# Patient Record
Sex: MALE | Race: WHITE | NOT HISPANIC OR LATINO | Employment: OTHER | URBAN - METROPOLITAN AREA
[De-identification: names, ages, dates, MRNs, and addresses within clinical notes are randomized per-mention and may not be internally consistent; named-entity substitution may affect disease eponyms.]

---

## 2017-10-18 ENCOUNTER — HOSPITAL ENCOUNTER (EMERGENCY)
Facility: HOSPITAL | Age: 67
Discharge: HOME | End: 2017-10-18
Admitting: EMERGENCY MEDICINE
Payer: COMMERCIAL

## 2017-10-18 ENCOUNTER — APPOINTMENT (EMERGENCY)
Dept: CT IMAGING | Facility: HOSPITAL | Age: 67
End: 2017-10-18
Payer: COMMERCIAL

## 2017-10-18 ENCOUNTER — APPOINTMENT (EMERGENCY)
Dept: RADIOLOGY | Facility: HOSPITAL | Age: 67
End: 2017-10-18
Payer: COMMERCIAL

## 2017-10-18 VITALS
DIASTOLIC BLOOD PRESSURE: 67 MMHG | WEIGHT: 166.23 LBS | OXYGEN SATURATION: 100 % | TEMPERATURE: 97.9 F | RESPIRATION RATE: 18 BRPM | HEART RATE: 68 BPM | SYSTOLIC BLOOD PRESSURE: 150 MMHG

## 2017-10-18 DIAGNOSIS — R42 ORTHOSTATIC DIZZINESS: Primary | ICD-10-CM

## 2017-10-18 LAB
ALBUMIN SERPL BCP-MCNC: 3.5 G/DL (ref 3.5–5)
ALP SERPL-CCNC: 117 U/L (ref 46–116)
ALT SERPL W P-5'-P-CCNC: 18 U/L (ref 12–78)
ANION GAP SERPL CALCULATED.3IONS-SCNC: 7 MMOL/L (ref 4–13)
APTT PPP: 30 SECONDS (ref 23–35)
AST SERPL W P-5'-P-CCNC: 17 U/L (ref 5–45)
BASOPHILS # BLD AUTO: 0.03 THOUSANDS/ΜL (ref 0–0.1)
BASOPHILS NFR BLD AUTO: 0 % (ref 0–1)
BILIRUB DIRECT SERPL-MCNC: 0.11 MG/DL (ref 0–0.2)
BILIRUB SERPL-MCNC: 0.6 MG/DL (ref 0.2–1)
BUN SERPL-MCNC: 18 MG/DL (ref 5–25)
CALCIUM SERPL-MCNC: 9.1 MG/DL (ref 8.3–10.1)
CHLORIDE SERPL-SCNC: 106 MMOL/L (ref 100–108)
CLARITY, POC: CLEAR
CO2 SERPL-SCNC: 30 MMOL/L (ref 21–32)
COLOR, POC: YELLOW
CREAT SERPL-MCNC: 1.06 MG/DL (ref 0.6–1.3)
EOSINOPHIL # BLD AUTO: 0.15 THOUSAND/ΜL (ref 0–0.61)
EOSINOPHIL NFR BLD AUTO: 2 % (ref 0–6)
ERYTHROCYTE [DISTWIDTH] IN BLOOD BY AUTOMATED COUNT: 14.6 % (ref 11.6–15.1)
EXT BILIRUBIN, UA: ABNORMAL
EXT BLOOD URINE: ABNORMAL
EXT GLUCOSE, UA: ABNORMAL
EXT KETONES: ABNORMAL
EXT NITRITE, UA: ABNORMAL
EXT PH, UA: 6
EXT PROTEIN, UA: ABNORMAL
EXT SPECIFIC GRAVITY, UA: 1.02
EXT UROBILINOGEN: 0.2
GFR SERPL CREATININE-BSD FRML MDRD: 72 ML/MIN/1.73SQ M
GLUCOSE SERPL-MCNC: 127 MG/DL (ref 65–140)
HCT VFR BLD AUTO: 36.8 % (ref 36.5–49.3)
HGB BLD-MCNC: 11.3 G/DL (ref 12–17)
INR PPP: 1 (ref 0.86–1.16)
LACTATE SERPL-SCNC: 1 MMOL/L (ref 0.5–2)
LYMPHOCYTES # BLD AUTO: 2.26 THOUSANDS/ΜL (ref 0.6–4.47)
LYMPHOCYTES NFR BLD AUTO: 33 % (ref 14–44)
MCH RBC QN AUTO: 24.9 PG (ref 26.8–34.3)
MCHC RBC AUTO-ENTMCNC: 30.7 G/DL (ref 31.4–37.4)
MCV RBC AUTO: 81 FL (ref 82–98)
MONOCYTES # BLD AUTO: 0.6 THOUSAND/ΜL (ref 0.17–1.22)
MONOCYTES NFR BLD AUTO: 9 % (ref 4–12)
NEUTROPHILS # BLD AUTO: 3.86 THOUSANDS/ΜL (ref 1.85–7.62)
NEUTS SEG NFR BLD AUTO: 56 % (ref 43–75)
NRBC BLD AUTO-RTO: 0 /100 WBCS
PLATELET # BLD AUTO: 166 THOUSANDS/UL (ref 149–390)
PMV BLD AUTO: 10.5 FL (ref 8.9–12.7)
POTASSIUM SERPL-SCNC: 4.4 MMOL/L (ref 3.5–5.3)
PROT SERPL-MCNC: 7.7 G/DL (ref 6.4–8.2)
PROTHROMBIN TIME: 13.4 SECONDS (ref 12.1–14.4)
RBC # BLD AUTO: 4.53 MILLION/UL (ref 3.88–5.62)
SODIUM SERPL-SCNC: 143 MMOL/L (ref 136–145)
TROPONIN I SERPL-MCNC: 0.02 NG/ML
TROPONIN I SERPL-MCNC: 0.02 NG/ML
TSH SERPL DL<=0.05 MIU/L-ACNC: 0.74 UIU/ML (ref 0.36–3.74)
WBC # BLD AUTO: 6.92 THOUSAND/UL (ref 4.31–10.16)
WBC # BLD EST: ABNORMAL 10*3/UL

## 2017-10-18 PROCEDURE — 99285 EMERGENCY DEPT VISIT HI MDM: CPT

## 2017-10-18 PROCEDURE — 96374 THER/PROPH/DIAG INJ IV PUSH: CPT

## 2017-10-18 PROCEDURE — 70450 CT HEAD/BRAIN W/O DYE: CPT

## 2017-10-18 PROCEDURE — 84443 ASSAY THYROID STIM HORMONE: CPT | Performed by: PHYSICIAN ASSISTANT

## 2017-10-18 PROCEDURE — 71020 HB CHEST X-RAY 2VW FRONTAL&LATL: CPT

## 2017-10-18 PROCEDURE — 80048 BASIC METABOLIC PNL TOTAL CA: CPT | Performed by: PHYSICIAN ASSISTANT

## 2017-10-18 PROCEDURE — 93005 ELECTROCARDIOGRAM TRACING: CPT | Performed by: PHYSICIAN ASSISTANT

## 2017-10-18 PROCEDURE — 85610 PROTHROMBIN TIME: CPT | Performed by: PHYSICIAN ASSISTANT

## 2017-10-18 PROCEDURE — 84484 ASSAY OF TROPONIN QUANT: CPT | Performed by: PHYSICIAN ASSISTANT

## 2017-10-18 PROCEDURE — 81002 URINALYSIS NONAUTO W/O SCOPE: CPT | Performed by: PHYSICIAN ASSISTANT

## 2017-10-18 PROCEDURE — 96361 HYDRATE IV INFUSION ADD-ON: CPT

## 2017-10-18 PROCEDURE — 80076 HEPATIC FUNCTION PANEL: CPT | Performed by: PHYSICIAN ASSISTANT

## 2017-10-18 PROCEDURE — 85025 COMPLETE CBC W/AUTO DIFF WBC: CPT | Performed by: PHYSICIAN ASSISTANT

## 2017-10-18 PROCEDURE — 73080 X-RAY EXAM OF ELBOW: CPT

## 2017-10-18 PROCEDURE — 85730 THROMBOPLASTIN TIME PARTIAL: CPT | Performed by: PHYSICIAN ASSISTANT

## 2017-10-18 PROCEDURE — 36415 COLL VENOUS BLD VENIPUNCTURE: CPT | Performed by: PHYSICIAN ASSISTANT

## 2017-10-18 PROCEDURE — 83605 ASSAY OF LACTIC ACID: CPT | Performed by: PHYSICIAN ASSISTANT

## 2017-10-18 RX ORDER — GLIPIZIDE 5 MG/1
5 TABLET ORAL
COMMUNITY
End: 2020-09-01 | Stop reason: HOSPADM

## 2017-10-18 RX ORDER — ISOSORBIDE DINITRATE 30 MG/1
30 TABLET ORAL DAILY
COMMUNITY

## 2017-10-18 RX ORDER — LANOLIN ALCOHOL/MO/W.PET/CERES
CREAM (GRAM) TOPICAL DAILY
COMMUNITY

## 2017-10-18 RX ORDER — ENALAPRIL MALEATE 10 MG/1
10 TABLET ORAL DAILY
COMMUNITY

## 2017-10-18 RX ORDER — ASPIRIN 81 MG/1
81 TABLET, CHEWABLE ORAL DAILY
COMMUNITY

## 2017-10-18 RX ORDER — ATORVASTATIN CALCIUM 20 MG/1
20 TABLET, FILM COATED ORAL DAILY
COMMUNITY

## 2017-10-18 RX ORDER — FAMOTIDINE 20 MG/1
20 TABLET, FILM COATED ORAL DAILY
COMMUNITY

## 2017-10-18 RX ORDER — MECLIZINE HYDROCHLORIDE 25 MG/1
25 TABLET ORAL 3 TIMES DAILY PRN
Qty: 15 TABLET | Refills: 0 | Status: SHIPPED | OUTPATIENT
Start: 2017-10-18 | End: 2018-02-03 | Stop reason: ALTCHOICE

## 2017-10-18 RX ORDER — FOLIC ACID 1 MG/1
1 TABLET ORAL DAILY
COMMUNITY

## 2017-10-18 RX ORDER — METOPROLOL SUCCINATE 25 MG/1
25 TABLET, EXTENDED RELEASE ORAL DAILY
COMMUNITY

## 2017-10-18 RX ORDER — PRASUGREL 10 MG/1
10 TABLET, FILM COATED ORAL DAILY
COMMUNITY

## 2017-10-18 RX ORDER — NITROGLYCERIN 40 MG/1
1 PATCH TRANSDERMAL AS NEEDED
COMMUNITY
End: 2020-08-31

## 2017-10-18 RX ORDER — ONDANSETRON 2 MG/ML
4 INJECTION INTRAMUSCULAR; INTRAVENOUS ONCE
Status: COMPLETED | OUTPATIENT
Start: 2017-10-18 | End: 2017-10-18

## 2017-10-18 RX ORDER — GABAPENTIN 300 MG/1
100 CAPSULE ORAL 2 TIMES DAILY
COMMUNITY

## 2017-10-18 RX ORDER — MECLIZINE HYDROCHLORIDE 25 MG/1
50 TABLET ORAL ONCE
Status: COMPLETED | OUTPATIENT
Start: 2017-10-18 | End: 2017-10-18

## 2017-10-18 RX ADMIN — SODIUM CHLORIDE 1000 ML: 0.9 INJECTION, SOLUTION INTRAVENOUS at 16:48

## 2017-10-18 RX ADMIN — MECLIZINE HYDROCHLORIDE 50 MG: 25 TABLET ORAL at 16:49

## 2017-10-18 RX ADMIN — ONDANSETRON 4 MG: 2 INJECTION INTRAMUSCULAR; INTRAVENOUS at 16:48

## 2017-10-18 NOTE — ED PROVIDER NOTES
History  Chief Complaint   Patient presents with    Dizziness     pt states he felt dizzy and fell in his bathroom  Pt hit his head  Denies LOC  Pt still dizzy on arrival      Fall     69-year-old male presents for evaluation of dizziness  Occurred just prior to arrival   States he was getting out of his bed to go the bathroom when he started to feel dizzy  States it was a combination of feeling like he was going to pass out on room spinning  He fell forward and braced his fall into a wall with his hands and ended up striking his head  He never lost consciousness  He never fell to the ground  He remembers everything before and afterwards  Upon getting up he continued to feel slightly dizzy  His wife notes that she has previously told him he needs to take his time when he gets out of bed to go to the bathroom to prevent him from becoming dizzy  He has never had dizziness this severe  Denies any headache with this  No visual disturbances  No chest pain or shortness of breath with this  He continues to feel dizzy although slightly improving  No recent illnesses  No neck pain or stiffness  No fevers chills nausea vomiting  Also states he struck his elbow when he braced his fall, left elbow  Small abrasion  Minimal pain  Past medical history of hypertension hyperlipidemia and diabetes          History provided by:  Patient   used: No    Dizziness   Quality:  Lightheadedness and room spinning  Severity:  Moderate  Onset quality:  Gradual  Duration:  2 hours  Timing:  Constant  Progression:  Unchanged  Chronicity:  New  Context: standing up    Relieved by:  Nothing  Worsened by:  Nothing  Ineffective treatments:  None tried  Associated symptoms: no blood in stool, no chest pain, no diarrhea, no headaches, no hearing loss, no nausea, no palpitations, no shortness of breath, no syncope, no tinnitus, no vision changes, no vomiting and no weakness    Risk factors: heart disease and multiple medications    Risk factors: no anemia, no hx of stroke, no hx of vertigo, no Meniere's disease and no new medications        Prior to Admission Medications   Prescriptions Last Dose Informant Patient Reported? Taking?   aspirin 81 mg chewable tablet   Yes Yes   Sig: Chew 81 mg daily   atorvastatin (LIPITOR) 20 mg tablet   Yes Yes   Sig: Take 20 mg by mouth daily   cyanocobalamin (VITAMIN B-12) 1,000 mcg tablet   Yes Yes   Sig: Take by mouth daily   enalapril (VASOTEC) 10 mg tablet   Yes Yes   Sig: Take 10 mg by mouth daily   famotidine (PEPCID) 20 mg tablet   Yes Yes   Sig: Take 20 mg by mouth daily   folic acid (FOLVITE) 1 mg tablet   Yes Yes   Sig: Take by mouth daily   gabapentin (NEURONTIN) 300 mg capsule   Yes Yes   Sig: Take 100 mg by mouth 3 (three) times a day   glipiZIDE (GLUCOTROL) 5 mg tablet   Yes Yes   Sig: Take 5 mg by mouth 2 (two) times a day before meals   isosorbide dinitrate (ISORDIL) 30 mg tablet   Yes Yes   Sig: Take 30 mg by mouth daily   metFORMIN (GLUCOPHAGE) 1000 MG tablet   Yes Yes   Sig: Take 1,000 mg by mouth 2 (two) times a day with meals   metoprolol succinate (TOPROL-XL) 25 mg 24 hr tablet   Yes Yes   Sig: Take 25 mg by mouth 2 (two) times a day   nitroglycerin (NITRODUR) 0 2 mg/hr   Yes Yes   Sig: Place 1 patch on the skin daily   prasugrel (EFFIENT) tablet   Yes Yes   Sig: Take 10 mg by mouth   sitaGLIPtin (JANUVIA) 100 mg tablet   Yes Yes   Sig: Take 100 mg by mouth daily      Facility-Administered Medications: None       Past Medical History:   Diagnosis Date    Diabetes mellitus ([facility] [address] )     Hyperlipidemia     Hypertension        No past surgical history on file  No family history on file  I have reviewed and agree with the history as documented      Social History   Substance Use Topics    Smoking status: Current Every Day Smoker     Packs/day: 0 25     Types: Cigarettes    Smokeless tobacco: Not on file    Alcohol use No        Review of Systems   Constitutional: Negative for activity change, appetite change, chills, diaphoresis, fatigue, fever and unexpected weight change  HENT: Negative for congestion, hearing loss, rhinorrhea, sinus pressure, sore throat, tinnitus and trouble swallowing  Eyes: Negative for photophobia and visual disturbance  Respiratory: Negative for apnea, cough, choking, chest tightness, shortness of breath, wheezing and stridor  Cardiovascular: Negative for chest pain, palpitations, leg swelling and syncope  Gastrointestinal: Negative for abdominal distention, abdominal pain, blood in stool, constipation, diarrhea, nausea and vomiting  Genitourinary: Negative for decreased urine volume, difficulty urinating, dysuria, enuresis, flank pain, frequency, hematuria and urgency  Musculoskeletal: Negative for arthralgias, myalgias, neck pain and neck stiffness  Skin: Negative for color change, pallor, rash and wound  Allergic/Immunologic: Negative  Neurological: Positive for dizziness and light-headedness  Negative for tremors, syncope, weakness, numbness and headaches  Hematological: Negative  Psychiatric/Behavioral: Negative  All other systems reviewed and are negative  Physical Exam  ED Triage Vitals   Temperature Pulse Respirations Blood Pressure SpO2   10/18/17 1800 10/18/17 1625 10/18/17 1625 10/18/17 1625 10/18/17 1625   97 9 °F (36 6 °C) 65 18 (!) 175/75 100 %      Temp Source Heart Rate Source Patient Position - Orthostatic VS BP Location FiO2 (%)   10/18/17 1800 10/18/17 1625 10/18/17 1625 10/18/17 1625 --   Oral Monitor Lying Right arm       Pain Score       10/18/17 1958       No Pain           Physical Exam   Constitutional: He is oriented to person, place, and time  He appears well-developed and well-nourished  Non-toxic appearance  He does not have a sickly appearance  He does not appear ill  No distress  HENT:   Head: Normocephalic and atraumatic     Eyes: EOM and lids are normal  Pupils are equal, round, and reactive to light  Neck: Normal range of motion  Neck supple  Cardiovascular: Normal rate, regular rhythm, S1 normal, S2 normal, normal heart sounds, intact distal pulses and normal pulses  Exam reveals no gallop, no distant heart sounds, no friction rub and no decreased pulses  No murmur heard  Pulses:       Radial pulses are 2+ on the right side, and 2+ on the left side  Pulmonary/Chest: Effort normal and breath sounds normal  No accessory muscle usage  No apnea, no tachypnea and no bradypnea  No respiratory distress  He has no decreased breath sounds  He has no wheezes  He has no rhonchi  He has no rales  Abdominal: Soft  Normal appearance and bowel sounds are normal  He exhibits no distension and no mass  There is no tenderness  There is no rigidity, no rebound and no guarding  No hernia  Musculoskeletal: Normal range of motion  He exhibits no edema or deformity  Left forearm: He exhibits tenderness (Minimal tenderness surrounding the abrasion of the left forearm/elbow no laceration  No bleeding  No significant swelling )  Neurological: He is alert and oriented to person, place, and time  No cranial nerve deficit  GCS eye subscore is 4  GCS verbal subscore is 5  GCS motor subscore is 6  GCS 15  AAOx3  No focal neuro deficits  CN II-XII grossly intact  Speech normal, no aphasia or dysarthria  No pronator drift  Cerebellar function normal  Finger to nose normal  PERRL  EOMI  Peripheral vision intact  No nystagmus  Upper and lower extremity strength 5/5 through   strength 5/5 b/l  Gross sensation intact b/l  Pt ambulating in department  Skin: Skin is warm, dry and intact  No rash noted  He is not diaphoretic  No erythema  No pallor  Psychiatric: His speech is normal    Nursing note and vitals reviewed        ED Medications  Medications   ondansetron (ZOFRAN) injection 4 mg (4 mg Intravenous Given 10/18/17 9187)   sodium chloride 0 9 % bolus 1,000 mL (0 mL Intravenous Stopped 10/18/17 1807)   meclizine (ANTIVERT) tablet 50 mg (50 mg Oral Given 10/18/17 1649)       Diagnostic Studies  Labs Reviewed   CBC AND DIFFERENTIAL - Abnormal        Result Value Ref Range Status    Hemoglobin 11 3 (*) 12 0 - 17 0 g/dL Final    MCV 81 (*) 82 - 98 fL Final    MCH 24 9 (*) 26 8 - 34 3 pg Final    MCHC 30 7 (*) 31 4 - 37 4 g/dL Final    WBC 6 92  4 31 - 10 16 Thousand/uL Final    RBC 4 53  3 88 - 5 62 Million/uL Final    Hematocrit 36 8  36 5 - 49 3 % Final    RDW 14 6  11 6 - 15 1 % Final    MPV 10 5  8 9 - 12 7 fL Final    Platelets 952  866 - 390 Thousands/uL Final    nRBC 0  /100 WBCs Final    Neutrophils Relative 56  43 - 75 % Final    Lymphocytes Relative 33  14 - 44 % Final    Monocytes Relative 9  4 - 12 % Final    Eosinophils Relative 2  0 - 6 % Final    Basophils Relative 0  0 - 1 % Final    Neutrophils Absolute 3 86  1 85 - 7 62 Thousands/µL Final    Lymphocytes Absolute 2 26  0 60 - 4 47 Thousands/µL Final    Monocytes Absolute 0 60  0 17 - 1 22 Thousand/µL Final    Eosinophils Absolute 0 15  0 00 - 0 61 Thousand/µL Final    Basophils Absolute 0 03  0 00 - 0 10 Thousands/µL Final   HEPATIC FUNCTION PANEL - Abnormal     Alkaline Phosphatase 117 (*) 46 - 116 U/L Final    Total Bilirubin 0 60  0 20 - 1 00 mg/dL Final    Bilirubin, Direct 0 11  0 00 - 0 20 mg/dL Final    AST 17  5 - 45 U/L Final    Comment:   Specimen collection should occur prior to Sulfasalazine administration due to the potential for falsely depressed results  ALT 18  12 - 78 U/L Final    Comment:   Specimen collection should occur prior to Sulfasalazine administration due to the potential for falsely depressed results       Total Protein 7 7  6 4 - 8 2 g/dL Final    Albumin 3 5  3 5 - 5 0 g/dL Final   POCT URINALYSIS DIPSTICK - Abnormal     Color, UA yellow   Final    Clarity, UA clear   Final    EXT Glucose, UA neg   Final    EXT Bilirubin, UA (Ref: Negative) neg   Final    EXT Ketones, UA (Ref: Negative) neg Final    EXT Spec Grav, UA 1 020   Final    EXT Blood, UA (Ref: Negative) trace   Final    EXT pH, UA 6 0   Final    EXT Protein, UA (Ref: Negative) 100++   Final    EXT Urobilinogen, UA (Ref: 0 2- 1 0) 0 2   Final    EXT Leukocytes, UA (Ref: Negative) neg   Final    EXT Nitrite, UA (Ref: Negative) neg   Final   PROTIME-INR - Normal    Protime 13 4  12 1 - 14 4 seconds Final    INR 1 00  0 86 - 1 16 Final   APTT - Normal    PTT 30  23 - 35 seconds Final    Narrative: Therapeutic Heparin Range = 60-90 seconds   TROPONIN I - Normal    Troponin I 0 02  <=0 04 ng/mL Final    Comment: 3Autovalidation override    Narrative:     [employer] Chemistry analyzer 99% cutoff is > 0 04 ng/mL in network labs    o cTnI 99% cutoff is useful only when applied to patients in the clinical setting of myocardial ischemia  o cTnI 99% cutoff should be interpreted in the context of clinical history, ECG findings and possibly cardiac imaging to establish correct diagnosis  o cTnI 99% cutoff may be suggestive but clearly not indicative of a coronary event without the clinical setting of myocardial ischemia  LACTIC ACID, PLASMA - Normal    LACTIC ACID 1 0  0 5 - 2 0 mmol/L Final    Narrative:     Result may be elevated if tourniquet was used during collection  TSH, 3RD GENERATION - Normal    TSH 3RD GENERATON 0 744  0 358 - 3 740 uIU/mL Final    Narrative:     Patients undergoing fluorescein dye angiography may retain small amounts of fluorescein in the body for 48-72 hours post procedure  Samples containing fluorescein can produce falsely depressed TSH values  If the patient had this procedure,a specimen should be resubmitted post fluorescein clearance     TROPONIN I - Normal    Troponin I 0 02  <=0 04 ng/mL Final    Comment: 3Autovalidation override    Narrative:     [employer] Chemistry analyzer 99% cutoff is > 0 04 ng/mL in network labs    o cTnI 99% cutoff is useful only when applied to patients in the clinical setting of myocardial ischemia  o cTnI 99% cutoff should be interpreted in the context of clinical history, ECG findings and possibly cardiac imaging to establish correct diagnosis  o cTnI 99% cutoff may be suggestive but clearly not indicative of a coronary event without the clinical setting of myocardial ischemia  BASIC METABOLIC PANEL    Sodium 966  136 - 145 mmol/L Final    Potassium 4 4  3 5 - 5 3 mmol/L Final    Chloride 106  100 - 108 mmol/L Final    CO2 30  21 - 32 mmol/L Final    Anion Gap 7  4 - 13 mmol/L Final    BUN 18  5 - 25 mg/dL Final    Creatinine 1 06  0 60 - 1 30 mg/dL Final    Comment: Standardized to IDMS reference method    Glucose 127  65 - 140 mg/dL Final    Comment:   If the patient is fasting, the ADA then defines impaired fasting glucose as > 100 mg/dL and diabetes as > or equal to 123 mg/dL  Specimen collection should occur prior to Sulfasalazine administration due to the potential for falsely depressed results  Specimen collection should occur prior to Sulfapyridine administration due to the potential for falsely elevated results  Calcium 9 1  8 3 - 10 1 mg/dL Final    eGFR 72  ml/min/1 73sq m Final    Narrative:     National Kidney Disease Education Program recommendations are as follows:  GFR calculation is accurate only with a steady state creatinine  Chronic Kidney disease less than 60 ml/min/1 73 sq  meters  Kidney failure less than 15 ml/min/1 73 sq  meters  XR chest 2 views   Final Result   No active pulmonary disease  Workstation performed: [id number]         XR elbow 3+ views LEFT   Final Result   No acute osseous abnormality  Workstation performed: [id number]         CT head without contrast   Final Result   No acute intracranial abnormality  Workstation performed: [id number]             Procedures  Procedures      Phone Contacts  ED Phone Contact    ED Course  ED Course as of Oct 26 0052   Wed Oct 18, 2017   1813 Feels better  Resting comfortably    Asking to eat     [address] EKG unchanged  Awaiting troponin          HEART Risk Score    Flowsheet Row Most Recent Value   History  0 Filed at: 10/18/2017 1635   ECG  0 Filed at: 10/18/2017 1635   Age  2 Filed at: 10/18/2017 1635   Risk Factors  2 Filed at: 10/18/2017 1635   Troponin  0 Filed at: 10/18/2017 1635   Heart Score Risk Calculator   History  0 Filed at: 10/18/2017 1635   ECG  0 Filed at: 10/18/2017 1635   Age  2 Filed at: 10/18/2017 1635   Risk Factors  2 Filed at: 10/18/2017 1635   Troponin  0 Filed at: 10/18/2017 1635   HEART Score  4 Filed at: 10/18/2017 1635   HEART Score  4 Filed at: 10/18/2017 1635          NIH Stroke Scale    Flowsheet Row Most Recent Value   Level of Consciousness (1a )  0 Filed at: 10/18/2017 1635   LOC Questions (1b )  0 Filed at: 10/18/2017 1635   LOC Commands (1c )  0 Filed at: 10/18/2017 1635   Best Gaze (2 )  0 Filed at: 10/18/2017 1635   Visual (3 )  0 Filed at: 10/18/2017 1635   Facial Palsy (4 )  0 Filed at: 10/18/2017 1635   Motor Arm, Left (5a )  0 Filed at: 10/18/2017 1635   Motor Arm, Right (5b )  0 Filed at: 10/18/2017 1635   Motor Leg, Left (6a )  0 Filed at: 10/18/2017 1635   Motor Leg, Right (6b )  0 Filed at: 10/18/2017 1635   Limb Ataxia (7 )  0 Filed at: 10/18/2017 1635   Sensory (8 )  0 Filed at: 10/18/2017 1635   Best Language (9 )  0 Filed at: 10/18/2017 1635   Dysarthria (10 )  0 Filed at: 10/18/2017 1635   Extinction and Inattention (11 ) (Formerly Neglect)  0 Filed at: 10/18/2017 1635   Total  0 Filed at: 10/18/2017 1635                        MDM  Number of Diagnoses or Management Options  Orthostatic dizziness: new and requires workup  Diagnosis management comments: DDx including but not limited to: Labyrinthitis, vestibular neuronitis,benign paroxysmal positional vertigo, Meniere's disease, metabolic abnormality, otitis media, tumor, intracranial process, cardiac etiology; doubt vertebral or carotid artery dissection    Differential for his elbow injuring includes but is not limited to sprain, strain, fracture, contusion  Plan:  CT head, cardiac workup, laboratory evaluation, x-ray the elbow  Analgesia  Fluids and meclizine  History suspicious for orthostatic hypotension particularly as patient states his symptoms are reproduced when he gets out of bed and starts ambulating  Amount and/or Complexity of Data Reviewed  Clinical lab tests: ordered and reviewed  Tests in the radiology section of CPT®: ordered and reviewed  Discuss the patient with other providers: yes  Independent visualization of images, tracings, or specimens: yes    Risk of Complications, Morbidity, and/or Mortality  Presenting problems: moderate  Management options: low  General comments: 58-year-old male with dizziness, fall, elbow injury  X-ray of the left elbow obtained reveals no acute osseous abnormalities  Likely a contusion injury  In regards to his dizziness after being given fluids and meclizine his dizziness completely resolved  He is resting comfortably  He has been eating and drinking  He is requesting to go home  He feels much better  He is ambulating in the department without difficulty  His laboratory evaluation is overall unremarkable  Explained to the family that if he has close follow-up with feel comfortable discharging him home as this history and exam is consistent with orthostatic hypertension  Explained that if he suddenly worsens or has persistent dizziness he needs to return at which point he may need to be admitted for TIA workup  The family understands and agrees with this plan  He is already on aspirin  Will make no changes to his chronic medications at this time  Will add meclizine p r n  for dizziness  He has close follow up with family physician  Family and patient states he can easily return should symptoms reoccur or worsen      Patient Progress  Patient progress: stable    CritCare Time    Disposition  Final diagnoses:   Orthostatic dizziness Time reflects when diagnosis was documented in both MDM as applicable and the Disposition within this note     Time User Action Codes Description Comment    10/18/2017  9:15 PM Ernie OHARA Add [R42] Orthostatic dizziness       ED Disposition     ED Disposition Condition Comment    Discharge  Greg Edmonds discharge to home/self care      Condition at discharge: Good        Follow-up Information     Follow up With Specialties Details Why Contact Info    Judy Zabalay  Go to your scheduled appointment this coming Tuesday [phone] [id number]  [phone]          Discharge Medication List as of 10/18/2017  9:17 PM      START taking these medications    Details   meclizine (ANTIVERT) 25 mg tablet Take 1 tablet by mouth 3 (three) times a day as needed for dizziness, Starting Wed 10/18/2017, Print         CONTINUE these medications which have NOT CHANGED    Details   aspirin 81 mg chewable tablet Chew 81 mg daily, Historical Med      atorvastatin (LIPITOR) 20 mg tablet Take 20 mg by mouth daily, Historical Med      cyanocobalamin (VITAMIN B-12) 1,000 mcg tablet Take by mouth daily, Historical Med      enalapril (VASOTEC) 10 mg tablet Take 10 mg by mouth daily, Historical Med      famotidine (PEPCID) 20 mg tablet Take 20 mg by mouth daily, Historical Med      folic acid (FOLVITE) 1 mg tablet Take by mouth daily, Historical Med      gabapentin (NEURONTIN) 300 mg capsule Take 100 mg by mouth 3 (three) times a day, Historical Med      glipiZIDE (GLUCOTROL) 5 mg tablet Take 5 mg by mouth 2 (two) times a day before meals, Historical Med      isosorbide dinitrate (ISORDIL) 30 mg tablet Take 30 mg by mouth daily, Historical Med      metFORMIN (GLUCOPHAGE) 1000 MG tablet Take 1,000 mg by mouth 2 (two) times a day with meals, Historical Med      metoprolol succinate (TOPROL-XL) 25 mg 24 hr tablet Take 25 mg by mouth 2 (two) times a day, Historical Med      nitroglycerin (NITRODUR) 0 2 mg/hr Place 1 patch on the skin daily, Historical Med      prasugrel (EFFIENT) tablet Take 10 mg by mouth, Historical Med      sitaGLIPtin (JANUVIA) 100 mg tablet Take 100 mg by mouth daily, Historical Med           No discharge procedures on file      ED Provider  Electronically Signed by       Vani Rodriguez PA-C  10/26/17 8128

## 2017-10-19 NOTE — DISCHARGE INSTRUCTIONS
Return to the Emergency Department if increased vertigo, pain, fever, vomiting, dizziness, weakness, difficulty breathing or walking  Dizziness   WHAT YOU NEED TO KNOW:   What is dizziness? Dizziness is a feeling of being off balance or unsteady  Common causes of dizziness are an inner ear fluid imbalance or a lack of oxygen in your blood  Dizziness may be acute (lasts 3 days or less) or chronic (lasts longer than 3 days)  You may have dizzy spells that last from seconds to a few hours  What increases my risk for dizziness? Dizziness may get worse during certain activities or when you move a certain way  The following may also increase your risk for dizziness:  · Older age    · An infection, ear surgery, or an inner ear condition, such as Ménière disease    · Stroke, a brain tumor, or a recent head trauma     · An injury that causes a large amount of blood loss    · Heart or blood pressure problems     · Exposure to chemicals, or long-term alcohol use     · Medicines used to treat high blood pressure, seizure disorders, or anxiety and depression     · A nerve disorder, such as multiple sclerosis  What signs and symptoms may happen with dizziness? · A feeling that your surroundings are moving even though you are standing still    · Ringing in your ears or hearing loss     · Feeling faint or lightheaded     · Weakness or unsteadiness     · Double vision or eye movements you cannot control    · Nausea or vomiting     · Confusion  How is the cause of dizziness diagnosed? Your healthcare provider may ask when the dizziness started  Tell the provider if you have dizzy spells, and how long they last  Tell him or her what happens before you become dizzy  The provider will ask if you have other health conditions and if you take any medicines  He or she will check your blood pressure and pulse to see if your dizziness may be related to your heart   Your balance, strength, reflexes, and the way you walk may also be checked  You may need any of the following tests to help find the cause of your dizziness:  · An EKG  records the electrical activity of your heart  An EKG can be used to check for an abnormal heart beat or heart damage  · Blood tests  will check your blood sugar level, infection, and your blood cell levels  · CT or MRI  pictures check for a stroke, head injury, or brain tumor  They also check for brain bleeding or swelling  You may be given contrast liquid to help your brain show up better in the pictures  Tell the healthcare provider if you have ever had an allergic reaction to contrast liquid  Do not enter the MRI room with anything metal  Metal can cause serious injury  Tell the healthcare provider if you have any metal in or on your body  How is dizziness treated? Treatment will depend on the cause of your dizziness  Your healthcare provider may give you oxygen or medicines to decrease your dizziness and nausea  He may also refer you to a specialist  Sheila Kay may need to be admitted to the hospital for treatment  How can I manage my symptoms? · Do not drive  or operate heavy machinery when you are dizzy  · Get up slowly  from sitting or lying down  · Drink plenty of liquids  Liquids help prevent dehydration  Ask how much liquid to drink each day and which liquids are best for you  When should I seek immediate care? · You have a headache and a stiff neck  · You have shaking chills and a fever  · You vomit over and over with no relief  · Your vomit or bowel movements are red or black  · You have pain in your chest, back, or abdomen  · You have numbness, especially in your face, arms, or legs  · You have trouble moving your arms or legs  · You are confused  When should I contact my healthcare provider? · You have a fever  · Your symptoms do not get better with treatment  · You have questions or concerns about your condition or care    CARE AGREEMENT:   You have the right to help plan your care  Learn about your health condition and how it may be treated  Discuss treatment options with your caregivers to decide what care you want to receive  You always have the right to refuse treatment  The above information is an  only  It is not intended as medical advice for individual conditions or treatments  Talk to your doctor, nurse or pharmacist before following any medical regimen to see if it is safe and effective for you  © 2017 [employer]  Information is for End User's use only and may not be sold, redistributed or otherwise used for commercial purposes  All illustrations and images included in CareNotes® are the copyrighted property of [employer]  or [employer]

## 2017-10-19 NOTE — ED NOTES
D/c instructions and rx reviewed w/ pt  All questions and concerns addressed at this time         Bon Roque RN  10/18/17 3694

## 2017-10-20 LAB
ATRIAL RATE: 63 BPM
ATRIAL RATE: 73 BPM
P AXIS: 66 DEGREES
P AXIS: 70 DEGREES
PR INTERVAL: 170 MS
PR INTERVAL: 172 MS
QRS AXIS: 77 DEGREES
QRS AXIS: 78 DEGREES
QRSD INTERVAL: 92 MS
QRSD INTERVAL: 92 MS
QT INTERVAL: 398 MS
QT INTERVAL: 414 MS
QTC INTERVAL: 423 MS
QTC INTERVAL: 438 MS
T WAVE AXIS: 58 DEGREES
T WAVE AXIS: 59 DEGREES
VENTRICULAR RATE: 63 BPM
VENTRICULAR RATE: 73 BPM

## 2018-02-03 ENCOUNTER — APPOINTMENT (EMERGENCY)
Dept: RADIOLOGY | Facility: HOSPITAL | Age: 68
DRG: 194 | End: 2018-02-03
Payer: COMMERCIAL

## 2018-02-03 ENCOUNTER — HOSPITAL ENCOUNTER (INPATIENT)
Facility: HOSPITAL | Age: 68
LOS: 2 days | Discharge: HOME | DRG: 194 | End: 2018-02-05
Attending: EMERGENCY MEDICINE | Admitting: INTERNAL MEDICINE
Payer: COMMERCIAL

## 2018-02-03 DIAGNOSIS — J18.9 CAP (COMMUNITY ACQUIRED PNEUMONIA): ICD-10-CM

## 2018-02-03 DIAGNOSIS — I10 ESSENTIAL HYPERTENSION: Chronic | ICD-10-CM

## 2018-02-03 DIAGNOSIS — J10.1 INFLUENZA A: ICD-10-CM

## 2018-02-03 DIAGNOSIS — E11.9 DIABETES (HCC): Chronic | ICD-10-CM

## 2018-02-03 DIAGNOSIS — R06.02 SHORTNESS OF BREATH: Primary | ICD-10-CM

## 2018-02-03 DIAGNOSIS — R07.9 CHEST PAIN: ICD-10-CM

## 2018-02-03 LAB
ALBUMIN SERPL BCP-MCNC: 3.4 G/DL (ref 3.5–5)
ALP SERPL-CCNC: 118 U/L (ref 46–116)
ALT SERPL W P-5'-P-CCNC: 17 U/L (ref 12–78)
ANION GAP SERPL CALCULATED.3IONS-SCNC: 11 MMOL/L (ref 4–13)
AST SERPL W P-5'-P-CCNC: 18 U/L (ref 5–45)
BASOPHILS # BLD AUTO: 0.03 THOUSANDS/ΜL (ref 0–0.1)
BASOPHILS NFR BLD AUTO: 0 % (ref 0–1)
BILIRUB DIRECT SERPL-MCNC: 0.17 MG/DL (ref 0–0.2)
BILIRUB SERPL-MCNC: 0.4 MG/DL (ref 0.2–1)
BUN SERPL-MCNC: 28 MG/DL (ref 5–25)
CALCIUM SERPL-MCNC: 9.2 MG/DL (ref 8.3–10.1)
CHLORIDE SERPL-SCNC: 103 MMOL/L (ref 100–108)
CLARITY, POC: ABNORMAL
CO2 SERPL-SCNC: 28 MMOL/L (ref 21–32)
COLOR, POC: ABNORMAL
CREAT SERPL-MCNC: 1.32 MG/DL (ref 0.6–1.3)
EOSINOPHIL # BLD AUTO: 0.02 THOUSAND/ΜL (ref 0–0.61)
EOSINOPHIL NFR BLD AUTO: 0 % (ref 0–6)
ERYTHROCYTE [DISTWIDTH] IN BLOOD BY AUTOMATED COUNT: 14.6 % (ref 11.6–15.1)
EXT BILIRUBIN, UA: ABNORMAL
EXT BLOOD URINE: NEGATIVE
EXT GLUCOSE, UA: NEGATIVE
EXT KETONES: NEGATIVE
EXT NITRITE, UA: NEGATIVE
EXT PH, UA: 5
EXT PROTEIN, UA: ABNORMAL
EXT SPECIFIC GRAVITY, UA: 1.03
EXT UROBILINOGEN: 0.2
GFR SERPL CREATININE-BSD FRML MDRD: 55 ML/MIN/1.73SQ M
GLUCOSE SERPL-MCNC: 116 MG/DL (ref 65–140)
HCT VFR BLD AUTO: 34.6 % (ref 36.5–49.3)
HGB BLD-MCNC: 10.5 G/DL (ref 12–17)
LACTATE SERPL-SCNC: 2 MMOL/L (ref 0.5–2)
LYMPHOCYTES # BLD AUTO: 1.52 THOUSANDS/ΜL (ref 0.6–4.47)
LYMPHOCYTES NFR BLD AUTO: 20 % (ref 14–44)
MCH RBC QN AUTO: 24.9 PG (ref 26.8–34.3)
MCHC RBC AUTO-ENTMCNC: 30.3 G/DL (ref 31.4–37.4)
MCV RBC AUTO: 82 FL (ref 82–98)
MONOCYTES # BLD AUTO: 0.64 THOUSAND/ΜL (ref 0.17–1.22)
MONOCYTES NFR BLD AUTO: 8 % (ref 4–12)
NEUTROPHILS # BLD AUTO: 5.39 THOUSANDS/ΜL (ref 1.85–7.62)
NEUTS SEG NFR BLD AUTO: 71 % (ref 43–75)
NRBC BLD AUTO-RTO: 0 /100 WBCS
PLATELET # BLD AUTO: 165 THOUSANDS/UL (ref 149–390)
PMV BLD AUTO: 11.9 FL (ref 8.9–12.7)
POTASSIUM SERPL-SCNC: 4.4 MMOL/L (ref 3.5–5.3)
PROT SERPL-MCNC: 7.3 G/DL (ref 6.4–8.2)
RBC # BLD AUTO: 4.21 MILLION/UL (ref 3.88–5.62)
SODIUM SERPL-SCNC: 142 MMOL/L (ref 136–145)
TROPONIN I SERPL-MCNC: 0.03 NG/ML
WBC # BLD AUTO: 7.62 THOUSAND/UL (ref 4.31–10.16)
WBC # BLD EST: NEGATIVE 10*3/UL

## 2018-02-03 PROCEDURE — 96374 THER/PROPH/DIAG INJ IV PUSH: CPT

## 2018-02-03 PROCEDURE — 80076 HEPATIC FUNCTION PANEL: CPT | Performed by: PHYSICIAN ASSISTANT

## 2018-02-03 PROCEDURE — 84484 ASSAY OF TROPONIN QUANT: CPT | Performed by: PHYSICIAN ASSISTANT

## 2018-02-03 PROCEDURE — 83605 ASSAY OF LACTIC ACID: CPT | Performed by: PHYSICIAN ASSISTANT

## 2018-02-03 PROCEDURE — 71046 X-RAY EXAM CHEST 2 VIEWS: CPT

## 2018-02-03 PROCEDURE — 93005 ELECTROCARDIOGRAM TRACING: CPT

## 2018-02-03 PROCEDURE — 94640 AIRWAY INHALATION TREATMENT: CPT

## 2018-02-03 PROCEDURE — 99285 EMERGENCY DEPT VISIT HI MDM: CPT

## 2018-02-03 PROCEDURE — 99220 PR INITIAL OBSERVATION CARE/DAY 70 MINUTES: CPT | Performed by: PHYSICIAN ASSISTANT

## 2018-02-03 PROCEDURE — 96361 HYDRATE IV INFUSION ADD-ON: CPT

## 2018-02-03 PROCEDURE — 36415 COLL VENOUS BLD VENIPUNCTURE: CPT | Performed by: PHYSICIAN ASSISTANT

## 2018-02-03 PROCEDURE — 80048 BASIC METABOLIC PNL TOTAL CA: CPT | Performed by: PHYSICIAN ASSISTANT

## 2018-02-03 PROCEDURE — 81002 URINALYSIS NONAUTO W/O SCOPE: CPT | Performed by: PHYSICIAN ASSISTANT

## 2018-02-03 PROCEDURE — 85025 COMPLETE CBC W/AUTO DIFF WBC: CPT | Performed by: PHYSICIAN ASSISTANT

## 2018-02-03 RX ORDER — ISOSORBIDE DINITRATE 10 MG/1
30 TABLET ORAL DAILY
Status: DISCONTINUED | OUTPATIENT
Start: 2018-02-04 | End: 2018-02-05 | Stop reason: HOSPADM

## 2018-02-03 RX ORDER — INSULIN GLARGINE 100 [IU]/ML
30 INJECTION, SOLUTION SUBCUTANEOUS DAILY
Status: DISCONTINUED | OUTPATIENT
Start: 2018-02-04 | End: 2018-02-05 | Stop reason: HOSPADM

## 2018-02-03 RX ORDER — NICOTINE 21 MG/24HR
1 PATCH, TRANSDERMAL 24 HOURS TRANSDERMAL DAILY
Status: DISCONTINUED | OUTPATIENT
Start: 2018-02-04 | End: 2018-02-05 | Stop reason: HOSPADM

## 2018-02-03 RX ORDER — PRASUGREL 10 MG/1
10 TABLET, FILM COATED ORAL DAILY
Status: DISCONTINUED | OUTPATIENT
Start: 2018-02-04 | End: 2018-02-05 | Stop reason: HOSPADM

## 2018-02-03 RX ORDER — ONDANSETRON 2 MG/ML
4 INJECTION INTRAMUSCULAR; INTRAVENOUS ONCE
Status: COMPLETED | OUTPATIENT
Start: 2018-02-03 | End: 2018-02-03

## 2018-02-03 RX ORDER — ONDANSETRON 2 MG/ML
4 INJECTION INTRAMUSCULAR; INTRAVENOUS EVERY 6 HOURS PRN
Status: DISCONTINUED | OUTPATIENT
Start: 2018-02-03 | End: 2018-02-05 | Stop reason: HOSPADM

## 2018-02-03 RX ORDER — ASPIRIN 81 MG/1
81 TABLET, CHEWABLE ORAL DAILY
Status: DISCONTINUED | OUTPATIENT
Start: 2018-02-04 | End: 2018-02-05 | Stop reason: HOSPADM

## 2018-02-03 RX ORDER — SODIUM CHLORIDE 9 MG/ML
75 INJECTION, SOLUTION INTRAVENOUS CONTINUOUS
Status: DISCONTINUED | OUTPATIENT
Start: 2018-02-03 | End: 2018-02-05 | Stop reason: HOSPADM

## 2018-02-03 RX ORDER — ATORVASTATIN CALCIUM 20 MG/1
20 TABLET, FILM COATED ORAL DAILY
Status: DISCONTINUED | OUTPATIENT
Start: 2018-02-04 | End: 2018-02-05 | Stop reason: HOSPADM

## 2018-02-03 RX ORDER — HEPARIN SODIUM 5000 [USP'U]/ML
5000 INJECTION, SOLUTION INTRAVENOUS; SUBCUTANEOUS EVERY 8 HOURS SCHEDULED
Status: DISCONTINUED | OUTPATIENT
Start: 2018-02-04 | End: 2018-02-05 | Stop reason: HOSPADM

## 2018-02-03 RX ORDER — FOLIC ACID 1 MG/1
1 TABLET ORAL DAILY
Status: DISCONTINUED | OUTPATIENT
Start: 2018-02-04 | End: 2018-02-05 | Stop reason: HOSPADM

## 2018-02-03 RX ORDER — ACETAMINOPHEN 325 MG/1
650 TABLET ORAL EVERY 6 HOURS PRN
Status: DISCONTINUED | OUTPATIENT
Start: 2018-02-03 | End: 2018-02-05 | Stop reason: HOSPADM

## 2018-02-03 RX ORDER — FAMOTIDINE 20 MG/1
20 TABLET, FILM COATED ORAL DAILY
Status: DISCONTINUED | OUTPATIENT
Start: 2018-02-04 | End: 2018-02-05 | Stop reason: HOSPADM

## 2018-02-03 RX ORDER — INSULIN GLARGINE 100 [IU]/ML
30 INJECTION, SOLUTION SUBCUTANEOUS DAILY
Status: ON HOLD | COMMUNITY
End: 2020-09-01 | Stop reason: SDUPTHER

## 2018-02-03 RX ORDER — OSELTAMIVIR PHOSPHATE 75 MG/1
75 CAPSULE ORAL EVERY 12 HOURS SCHEDULED
Status: DISCONTINUED | OUTPATIENT
Start: 2018-02-03 | End: 2018-02-04 | Stop reason: DRUGHIGH

## 2018-02-03 RX ORDER — CHOLECALCIFEROL (VITAMIN D3) 125 MCG
1000 CAPSULE ORAL DAILY
Status: DISCONTINUED | OUTPATIENT
Start: 2018-02-04 | End: 2018-02-05 | Stop reason: HOSPADM

## 2018-02-03 RX ORDER — GABAPENTIN 100 MG/1
100 CAPSULE ORAL 2 TIMES DAILY
Status: DISCONTINUED | OUTPATIENT
Start: 2018-02-04 | End: 2018-02-05 | Stop reason: HOSPADM

## 2018-02-03 RX ORDER — METOPROLOL SUCCINATE 25 MG/1
25 TABLET, EXTENDED RELEASE ORAL 2 TIMES DAILY
Status: DISCONTINUED | OUTPATIENT
Start: 2018-02-04 | End: 2018-02-05 | Stop reason: HOSPADM

## 2018-02-03 RX ORDER — ENALAPRIL MALEATE 10 MG/1
10 TABLET ORAL DAILY
Status: DISCONTINUED | OUTPATIENT
Start: 2018-02-04 | End: 2018-02-05 | Stop reason: HOSPADM

## 2018-02-03 RX ORDER — ALBUTEROL SULFATE 2.5 MG/3ML
5 SOLUTION RESPIRATORY (INHALATION) ONCE
Status: COMPLETED | OUTPATIENT
Start: 2018-02-03 | End: 2018-02-03

## 2018-02-03 RX ADMIN — IPRATROPIUM BROMIDE 0.5 MG: 0.5 SOLUTION RESPIRATORY (INHALATION) at 20:16

## 2018-02-03 RX ADMIN — ONDANSETRON 4 MG: 2 INJECTION INTRAMUSCULAR; INTRAVENOUS at 20:12

## 2018-02-03 RX ADMIN — SODIUM CHLORIDE 1000 ML: 0.9 INJECTION, SOLUTION INTRAVENOUS at 20:10

## 2018-02-03 RX ADMIN — ALBUTEROL SULFATE 5 MG: 2.5 SOLUTION RESPIRATORY (INHALATION) at 20:15

## 2018-02-04 LAB
ANION GAP SERPL CALCULATED.3IONS-SCNC: 9 MMOL/L (ref 4–13)
ATRIAL RATE: 72 BPM
ATRIAL RATE: 85 BPM
BUN SERPL-MCNC: 22 MG/DL (ref 5–25)
CALCIUM SERPL-MCNC: 8.3 MG/DL (ref 8.3–10.1)
CHLORIDE SERPL-SCNC: 105 MMOL/L (ref 100–108)
CHOLEST SERPL-MCNC: 75 MG/DL (ref 50–200)
CO2 SERPL-SCNC: 26 MMOL/L (ref 21–32)
CREAT SERPL-MCNC: 1.11 MG/DL (ref 0.6–1.3)
ERYTHROCYTE [DISTWIDTH] IN BLOOD BY AUTOMATED COUNT: 14.6 % (ref 11.6–15.1)
EST. AVERAGE GLUCOSE BLD GHB EST-MCNC: 220 MG/DL
FLUAV AG SPEC QL: DETECTED
FLUBV AG SPEC QL: ABNORMAL
GFR SERPL CREATININE-BSD FRML MDRD: 67 ML/MIN/1.73SQ M
GLUCOSE SERPL-MCNC: 122 MG/DL (ref 65–140)
GLUCOSE SERPL-MCNC: 124 MG/DL (ref 65–140)
GLUCOSE SERPL-MCNC: 138 MG/DL (ref 65–140)
GLUCOSE SERPL-MCNC: 202 MG/DL (ref 65–140)
GLUCOSE SERPL-MCNC: 258 MG/DL (ref 65–140)
GLUCOSE SERPL-MCNC: 292 MG/DL (ref 65–140)
HBA1C MFR BLD: 9.3 % (ref 4.2–6.3)
HCT VFR BLD AUTO: 31.4 % (ref 36.5–49.3)
HDLC SERPL-MCNC: 22 MG/DL (ref 40–60)
HGB BLD-MCNC: 9.6 G/DL (ref 12–17)
LDLC SERPL CALC-MCNC: 35 MG/DL (ref 0–100)
MCH RBC QN AUTO: 25.1 PG (ref 26.8–34.3)
MCHC RBC AUTO-ENTMCNC: 30.6 G/DL (ref 31.4–37.4)
MCV RBC AUTO: 82 FL (ref 82–98)
P AXIS: 61 DEGREES
P AXIS: 66 DEGREES
PLATELET # BLD AUTO: 131 THOUSANDS/UL (ref 149–390)
PMV BLD AUTO: 10.5 FL (ref 8.9–12.7)
POTASSIUM SERPL-SCNC: 4 MMOL/L (ref 3.5–5.3)
PR INTERVAL: 154 MS
PR INTERVAL: 158 MS
QRS AXIS: 83 DEGREES
QRS AXIS: 88 DEGREES
QRSD INTERVAL: 86 MS
QRSD INTERVAL: 92 MS
QT INTERVAL: 374 MS
QT INTERVAL: 388 MS
QTC INTERVAL: 424 MS
QTC INTERVAL: 445 MS
RBC # BLD AUTO: 3.83 MILLION/UL (ref 3.88–5.62)
RSV B RNA SPEC QL NAA+PROBE: ABNORMAL
S PNEUM AG UR QL: NEGATIVE
SODIUM SERPL-SCNC: 140 MMOL/L (ref 136–145)
T WAVE AXIS: 38 DEGREES
T WAVE AXIS: 56 DEGREES
TRIGL SERPL-MCNC: 91 MG/DL
TROPONIN I SERPL-MCNC: 0.03 NG/ML
TROPONIN I SERPL-MCNC: 0.04 NG/ML
TROPONIN I SERPL-MCNC: 0.04 NG/ML
VENTRICULAR RATE: 72 BPM
VENTRICULAR RATE: 85 BPM
WBC # BLD AUTO: 5.93 THOUSAND/UL (ref 4.31–10.16)

## 2018-02-04 PROCEDURE — 93005 ELECTROCARDIOGRAM TRACING: CPT | Performed by: PHYSICIAN ASSISTANT

## 2018-02-04 PROCEDURE — 87449 NOS EACH ORGANISM AG IA: CPT | Performed by: PHYSICIAN ASSISTANT

## 2018-02-04 PROCEDURE — 82948 REAGENT STRIP/BLOOD GLUCOSE: CPT

## 2018-02-04 PROCEDURE — 94664 DEMO&/EVAL PT USE INHALER: CPT

## 2018-02-04 PROCEDURE — 99232 SBSQ HOSP IP/OBS MODERATE 35: CPT | Performed by: INTERNAL MEDICINE

## 2018-02-04 PROCEDURE — 84484 ASSAY OF TROPONIN QUANT: CPT | Performed by: PHYSICIAN ASSISTANT

## 2018-02-04 PROCEDURE — 80061 LIPID PANEL: CPT | Performed by: PHYSICIAN ASSISTANT

## 2018-02-04 PROCEDURE — 80048 BASIC METABOLIC PNL TOTAL CA: CPT | Performed by: PHYSICIAN ASSISTANT

## 2018-02-04 PROCEDURE — 87070 CULTURE OTHR SPECIMN AEROBIC: CPT | Performed by: PHYSICIAN ASSISTANT

## 2018-02-04 PROCEDURE — 94640 AIRWAY INHALATION TREATMENT: CPT

## 2018-02-04 PROCEDURE — 83036 HEMOGLOBIN GLYCOSYLATED A1C: CPT | Performed by: PHYSICIAN ASSISTANT

## 2018-02-04 PROCEDURE — 87798 DETECT AGENT NOS DNA AMP: CPT | Performed by: PHYSICIAN ASSISTANT

## 2018-02-04 PROCEDURE — 85027 COMPLETE CBC AUTOMATED: CPT | Performed by: PHYSICIAN ASSISTANT

## 2018-02-04 PROCEDURE — 93010 ELECTROCARDIOGRAM REPORT: CPT | Performed by: INTERNAL MEDICINE

## 2018-02-04 PROCEDURE — 87205 SMEAR GRAM STAIN: CPT | Performed by: PHYSICIAN ASSISTANT

## 2018-02-04 PROCEDURE — 94760 N-INVAS EAR/PLS OXIMETRY 1: CPT

## 2018-02-04 RX ORDER — OSELTAMIVIR PHOSPHATE 75 MG/1
75 CAPSULE ORAL EVERY 12 HOURS SCHEDULED
Status: DISCONTINUED | OUTPATIENT
Start: 2018-02-04 | End: 2018-02-05 | Stop reason: HOSPADM

## 2018-02-04 RX ORDER — OSELTAMIVIR PHOSPHATE 30 MG/1
30 CAPSULE ORAL EVERY 12 HOURS SCHEDULED
Status: DISCONTINUED | OUTPATIENT
Start: 2018-02-04 | End: 2018-02-04

## 2018-02-04 RX ORDER — LEVALBUTEROL 1.25 MG/.5ML
1.25 SOLUTION, CONCENTRATE RESPIRATORY (INHALATION)
Status: DISCONTINUED | OUTPATIENT
Start: 2018-02-04 | End: 2018-02-04

## 2018-02-04 RX ORDER — HYDRALAZINE HYDROCHLORIDE 20 MG/ML
10 INJECTION INTRAMUSCULAR; INTRAVENOUS EVERY 6 HOURS PRN
Status: DISCONTINUED | OUTPATIENT
Start: 2018-02-04 | End: 2018-02-05 | Stop reason: HOSPADM

## 2018-02-04 RX ORDER — LEVALBUTEROL 1.25 MG/.5ML
1.25 SOLUTION, CONCENTRATE RESPIRATORY (INHALATION)
Status: DISCONTINUED | OUTPATIENT
Start: 2018-02-04 | End: 2018-02-05 | Stop reason: HOSPADM

## 2018-02-04 RX ORDER — ALBUTEROL SULFATE 2.5 MG/3ML
2.5 SOLUTION RESPIRATORY (INHALATION) EVERY 4 HOURS PRN
Status: DISCONTINUED | OUTPATIENT
Start: 2018-02-04 | End: 2018-02-05 | Stop reason: HOSPADM

## 2018-02-04 RX ORDER — LEVALBUTEROL 1.25 MG/.5ML
1.25 SOLUTION, CONCENTRATE RESPIRATORY (INHALATION) EVERY 6 HOURS PRN
Status: DISCONTINUED | OUTPATIENT
Start: 2018-02-04 | End: 2018-02-04

## 2018-02-04 RX ORDER — GUAIFENESIN/DEXTROMETHORPHAN 100-10MG/5
10 SYRUP ORAL EVERY 4 HOURS PRN
Status: DISCONTINUED | OUTPATIENT
Start: 2018-02-04 | End: 2018-02-05 | Stop reason: HOSPADM

## 2018-02-04 RX ADMIN — ATORVASTATIN CALCIUM 20 MG: 20 TABLET, FILM COATED ORAL at 09:29

## 2018-02-04 RX ADMIN — IPRATROPIUM BROMIDE 0.5 MG: 0.5 SOLUTION RESPIRATORY (INHALATION) at 20:31

## 2018-02-04 RX ADMIN — LEVALBUTEROL HYDROCHLORIDE 1.25 MG: 1.25 SOLUTION, CONCENTRATE RESPIRATORY (INHALATION) at 20:31

## 2018-02-04 RX ADMIN — METOPROLOL SUCCINATE 25 MG: 25 TABLET, FILM COATED, EXTENDED RELEASE ORAL at 17:12

## 2018-02-04 RX ADMIN — ENALAPRIL MALEATE 10 MG: 10 TABLET ORAL at 09:29

## 2018-02-04 RX ADMIN — GABAPENTIN 100 MG: 100 CAPSULE ORAL at 09:28

## 2018-02-04 RX ADMIN — AZITHROMYCIN MONOHYDRATE 500 MG: 500 INJECTION, POWDER, LYOPHILIZED, FOR SOLUTION INTRAVENOUS at 02:02

## 2018-02-04 RX ADMIN — METOPROLOL SUCCINATE 25 MG: 25 TABLET, FILM COATED, EXTENDED RELEASE ORAL at 09:29

## 2018-02-04 RX ADMIN — OSELTAMIVIR PHOSPHATE 30 MG: 30 CAPSULE ORAL at 01:15

## 2018-02-04 RX ADMIN — HEPARIN SODIUM 5000 UNITS: 5000 INJECTION, SOLUTION INTRAVENOUS; SUBCUTANEOUS at 21:40

## 2018-02-04 RX ADMIN — GUAIFENESIN AND DEXTROMETHORPHAN 10 ML: 100; 10 SYRUP ORAL at 19:52

## 2018-02-04 RX ADMIN — CYANOCOBALAMIN TAB 500 MCG 1000 MCG: 500 TAB at 09:28

## 2018-02-04 RX ADMIN — ISOSORBIDE DINITRATE 30 MG: 10 TABLET ORAL at 09:29

## 2018-02-04 RX ADMIN — OSELTAMIVIR PHOSPHATE 75 MG: 75 CAPSULE ORAL at 21:40

## 2018-02-04 RX ADMIN — INSULIN LISPRO 4 UNITS: 100 INJECTION, SOLUTION INTRAVENOUS; SUBCUTANEOUS at 12:56

## 2018-02-04 RX ADMIN — SODIUM CHLORIDE 75 ML/HR: 0.9 INJECTION, SOLUTION INTRAVENOUS at 00:08

## 2018-02-04 RX ADMIN — INSULIN LISPRO 2 UNITS: 100 INJECTION, SOLUTION INTRAVENOUS; SUBCUTANEOUS at 01:21

## 2018-02-04 RX ADMIN — LEVALBUTEROL HYDROCHLORIDE 1.25 MG: 1.25 SOLUTION, CONCENTRATE RESPIRATORY (INHALATION) at 08:02

## 2018-02-04 RX ADMIN — GUAIFENESIN AND DEXTROMETHORPHAN 10 ML: 100; 10 SYRUP ORAL at 10:16

## 2018-02-04 RX ADMIN — OSELTAMIVIR PHOSPHATE 75 MG: 75 CAPSULE ORAL at 09:29

## 2018-02-04 RX ADMIN — NICOTINE 1 PATCH: 14 PATCH TRANSDERMAL at 09:29

## 2018-02-04 RX ADMIN — LEVALBUTEROL HYDROCHLORIDE 1.25 MG: 1.25 SOLUTION, CONCENTRATE RESPIRATORY (INHALATION) at 02:25

## 2018-02-04 RX ADMIN — IPRATROPIUM BROMIDE 0.5 MG: 0.5 SOLUTION RESPIRATORY (INHALATION) at 08:02

## 2018-02-04 RX ADMIN — ASPIRIN 81 MG 81 MG: 81 TABLET ORAL at 09:29

## 2018-02-04 RX ADMIN — INSULIN LISPRO 3 UNITS: 100 INJECTION, SOLUTION INTRAVENOUS; SUBCUTANEOUS at 15:29

## 2018-02-04 RX ADMIN — CEFTRIAXONE 1000 MG: 1 INJECTION, SOLUTION INTRAVENOUS at 01:19

## 2018-02-04 RX ADMIN — HEPARIN SODIUM 5000 UNITS: 5000 INJECTION, SOLUTION INTRAVENOUS; SUBCUTANEOUS at 13:00

## 2018-02-04 RX ADMIN — GABAPENTIN 100 MG: 100 CAPSULE ORAL at 17:12

## 2018-02-04 RX ADMIN — IPRATROPIUM BROMIDE 0.5 MG: 0.5 SOLUTION RESPIRATORY (INHALATION) at 02:25

## 2018-02-04 RX ADMIN — PRASUGREL HYDROCHLORIDE 10 MG: 10 TABLET, FILM COATED ORAL at 09:29

## 2018-02-04 RX ADMIN — INSULIN GLARGINE 30 UNITS: 100 INJECTION, SOLUTION SUBCUTANEOUS at 09:30

## 2018-02-04 RX ADMIN — HEPARIN SODIUM 5000 UNITS: 5000 INJECTION, SOLUTION INTRAVENOUS; SUBCUTANEOUS at 06:34

## 2018-02-04 RX ADMIN — GUAIFENESIN AND DEXTROMETHORPHAN 10 ML: 100; 10 SYRUP ORAL at 15:23

## 2018-02-04 RX ADMIN — ACETAMINOPHEN 650 MG: 325 TABLET ORAL at 09:29

## 2018-02-04 RX ADMIN — FOLIC ACID 1 MG: 1 TABLET ORAL at 09:29

## 2018-02-04 RX ADMIN — FAMOTIDINE 20 MG: 20 TABLET, FILM COATED ORAL at 09:29

## 2018-02-04 NOTE — RESPIRATORY THERAPY NOTE
RT Protocol Note  Augusto Edmonds 70 y o  male MRN: [id number]  Unit/Bed#: -01 Encounter: [id number]    Assessment    Principal Problem:    CAP (community acquired pneumonia)  Active Problems:    Chest pain    Hypertension    Diabetes ([facility] )      Home Pulmonary Medications:  None    Past Medical History:   Diagnosis Date    Diabetes mellitus ([place] [address] )     Hyperlipidemia     Hypertension      Social History     Social History    Marital status: /Civil Union     Spouse name: N/A    Number of children: N/A    Years of education: N/A     Social History Main Topics    Smoking status: Current Every Day Smoker     Packs/day: 0 25     Types: Cigarettes    Smokeless tobacco: None    Alcohol use No    Drug use: No    Sexual activity: Not Asked     Other Topics Concern    None     Social History Narrative    None       Subjective         Objective    Physical Exam:   Assessment Type: Assess only  General Appearance: Alert, Awake  Respiratory Pattern: Normal  Chest Assessment: Chest expansion symmetrical  Bilateral Breath Sounds: Diminished    Vitals:  Blood pressure (!) 177/72, pulse 84, temperature 99 2 °F (37 3 °C), temperature source Oral, resp  rate 20, height 5' 8" (1 727 m), weight 76 5 kg (168 lb 10 4 oz), SpO2 94 %  Imaging and other studies: I have personally reviewed pertinent reports  Plan    Respiratory Plan: Mild Distress pathway        Resp Comments: Patient visited for respiratory protocol  Patient awake and alert no distress  SpO2 97%, HR 83 on room air  BS are decreased  Patient does not use any respiratory meds at home  Will order Xopenex and Atrovent nebulizers TID and Q6PRN  Will continue to follow

## 2018-02-04 NOTE — CASE MANAGEMENT
Initial Clinical Review    Admission: Date/Time/Statement: 2/3/18 @ 2252     Orders Placed This Encounter   Procedures    Inpatient Admission (expected length of stay for this patient is greater than two midnights)     Standing Status:   Standing     Number of Occurrences:   1     Order Specific Question:   Admitting Physician     Answer:   Maria M Miller [[id number]]     Order Specific Question:   Level of Care     Answer:   Med Surg [[id number]]     Order Specific Question:   Estimated length of stay     Answer:   More than 2 Midnights     Order Specific Question:   Certification     Answer:   I certify that inpatient services are medically necessary for this patient for a duration of greater than two midnights  See H&P and MD Progress Notes for additional information about the patient's course of treatment   Inpatient Admission     Standing Status:   Standing     Number of Occurrences:   1     Order Specific Question:   Admitting Physician     Answer:   Maria M Miller [[id number]]     Order Specific Question:   Level of Care     Answer:   Med Surg [16]     Order Specific Question:   Estimated length of stay     Answer:   More than 2 Midnights     Order Specific Question:   Certification     Answer:   I certify that inpatient services are medically necessary for this patient for a duration of greater than two midnights  See H&P and MD Progress Notes for additional information about the patient's course of treatment  ED: Date/Time/Mode of Arrival:   ED Arrival Information     Expected Arrival Acuity Means of Arrival Escorted By Service Admission Type    - 2/3/2018 16:07 Urgent Walk-In Family Member General Medicine Urgent    Arrival Complaint    BODY WEAKNESS          Chief Complaint:   Chief Complaint   Patient presents with    Weakness - Generalized     patient presents to the ED with c/o generalized weakness, abdominal pain, and cough x2 days          History of Illness:    Chris Vásquez is a 70 y o  male who presents with complaints of cough and shortness of breath has been present for last day  Patient states he has been nauseated with vomiting  Patient states he has chest pain  Patient denies any abdominal pain or diarrhea  Patient denies any known fever  Patient has a known cardiac history        ED Vital Signs:   ED Triage Vitals   Temperature Pulse Respirations Blood Pressure SpO2   02/03/18 1718 02/03/18 1718 02/03/18 1718 02/03/18 1718 02/03/18 1718   98 2 °F (36 8 °C) 69 18 121/60 97 %      Temp Source Heart Rate Source Patient Position - Orthostatic VS BP Location FiO2 (%)   02/03/18 2341 02/03/18 2019 02/03/18 2019 02/03/18 2019 --   Oral Monitor Sitting Right arm       Pain Score       02/03/18 2019       No Pain        Wt Readings from Last 1 Encounters:   02/03/18 76 5 kg (168 lb 10 4 oz)       Vital Signs (abnormal):     Temp  Max  100 6    Abnormal Labs/Diagnostic Test Results: Albumin  3 4  BUN  28  Creat   1 32  H/H   10 5/34 6   ( 2/3)                    9  6/31 4   (   2/4)  CXR:  NAD    ED Treatment:   Medication Administration from 02/03/2018 1607 to 02/03/2018 2337       Date/Time Order Dose Route Action Action by Comments     02/03/2018 2224 sodium chloride 0 9 % bolus 1,000 mL 0 mL Intravenous Stopped J Luis Lopez RN      02/03/2018 2010 sodium chloride 0 9 % bolus 1,000 mL 1,000 mL Intravenous New Bag Aurora Trevizo RN      02/03/2018 2012 ondansetron (ZOFRAN) injection 4 mg 4 mg Intravenous Given Aurora Trevizo RN      02/03/2018 2015 albuterol inhalation solution 5 mg 5 mg Nebulization Given Stormy Dome, RN      02/03/2018 2016 ipratropium (ATROVENT) 0 02 % inhalation solution 0 5 mg 0 5 mg Nebulization Given Aurora Trevizo RN           Past Medical/Surgical History:    Active Ambulatory Problems     Diagnosis Date Noted    No Active Ambulatory Problems     Resolved Ambulatory Problems     Diagnosis Date Noted    No Resolved Ambulatory Problems     Past Medical History:   Diagnosis Date    Diabetes mellitus ([facility] )     Hyperlipidemia     Hypertension        Admitting Diagnosis: Shortness of breath [R06 02]  Chest pain [R07 9]  Generalized weakness [R53 1]    Age/Sex: 70 y o  male    Assessment/Plan:    Chest pain   Assessment & Plan     Admit telemetry  Consult Cardiology  Trend troponin          * CAP (community acquired pneumonia)   Assessment & Plan     Begin azithromycin and Rocephin  Respiratory protocol  Sputum culture pending  Influenza culture pending  Begin Tamiflu          Diabetes ([facility] )   Assessment & Plan     Hemoglobin A1c pending  Continue Lantus, insulin sliding scale          Hypertension   Assessment & Plan     Continue enalapril and metoprolol        Anticipated Length of Stay:  Patient will be admitted on an Inpatient basis with an anticipated length of stay of  more than 2 midnights     Justification for Hospital Stay:  Cardiac evaluation, IV antibiotics    Admission Orders:   IP  2/3  @    7421  Scheduled Meds:   Current Facility-Administered Medications:  acetaminophen 650 mg Oral Q6H PRN Valene MARIA INES Lewis-C    albuterol 2 5 mg Nebulization Q4H PRN Brian Nelson MD    aspirin 81 mg Oral Daily Valene MARIA INES Lewis-C    atorvastatin 20 mg Oral Daily Valene Joshua, PA-C    cefTRIAXone 1,000 mg Intravenous Q24H Troy Lewis PA-C Last Rate: Stopped (02/04/18 0122)   And        azithromycin 500 mg Intravenous Q24H Troy Lewis PA-C Last Rate: Stopped (02/04/18 0302)   cyanocobalamin 1,000 mcg Oral Daily Valkrystal Lewis, PA-C    dextromethorphan-guaiFENesin 10 mL Oral Q4H PRN Brian Nelson MD    enalapril 10 mg Oral Daily Valene Hedge PA-C    famotidine 20 mg Oral Daily Valene Joshua, PA-C    folic acid 1 mg Oral Daily Valene Hedge, PA-C    gabapentin 100 mg Oral BID ValMARIA INES Cool-C    heparin (porcine) 5,000 Units Subcutaneous [facility] Troy Lewis PA-C    hydrALAZINE 10 mg Intravenous Q6H PRN Brian Nelson MD    insulin glargine 30 Units Subcutaneous Daily Bill FunezDIEUDONNE    insulin lispro 1-6 Units Subcutaneous TID AC Bill Funez, PA-C    insulin lispro 1-6 Units Subcutaneous HS Bill FunezDIEUDONNE    ipratropium 0 5 mg Nebulization TID Mary Zabala MD    isosorbide dinitrate 30 mg Oral Daily Bill FunezDIEUDONNE    levalbuterol 1 25 mg Nebulization TID Mary Zabala MD    metoprolol succinate 25 mg Oral BID Bill FunezDIEUDONNE    nicotine 1 patch Transdermal Daily Bill FunezDIEUDONNE    ondansetron 4 mg Intravenous Q6H PRN Bill FunezDIEUDONNE    oseltamivir 75 mg Oral Q12H [address] Curt Blunt MD    prasugrel 10 mg Oral Daily Bill FunezDIEUDONNE    sodium chloride 75 mL/hr Intravenous Continuous Bill SinhaoccoDIEUDONNE Last Rate: 75 mL/hr (02/04/18 0008)     Continuous Infusions:   sodium chloride 75 mL/hr Last Rate: 75 mL/hr (02/04/18 0008)     PRN Meds:   acetaminophen    albuterol    dextromethorphan-guaiFENesin    hydrALAZINE    ondansetron    Tele  CCD diet  Cons cardiology  Serial troponin  Droplet precautions  Sputum c/s    Progress  Note  2/4  · SOB- secondary to CAP- on azithromycin and ceftriaxone  Will r/o flu  · 2  History of CABG- no chest pain  Cardiac enzymes negative  Cardiology consult discontinued  · 3  HTN- on enalapril, metoprolol and isordil  · 4  DM- on ISS  · 5  MINDY- improved with IVF  The patient will continue to require additional inpatient hospital stay due to SOB, pneumonia  ·      Thank you,  [address] [facility] in the [facility] by [address] for 2017  Network Utilization Review Department  Phone: [phone]; Fax [phone]  ATTENTION: The Network Utilization Review Department is now centralized for our 7 Facilities  Make a note that we have a new phone and fax numbers for our Department   Please call with any questions or concerns to [phone] and carefully follow the prompts so that you are directed to the right person  All voicemails are confidential  Fax any determinations, approvals, denials, and requests for initial or continue stay review clinical to [phone]  Due to HIGH CALL volume, it would be easier if you could please send faxed requests to expedite your requests and in part, help us provide discharge notifications faster

## 2018-02-04 NOTE — H&P
H&P- Mary Krishna 1950, 70 y o  male MRN: [id number]    Unit/Bed#: ED 24 Encounter: [id number]    Primary Care Provider: Bharathi Roy   Date and time admitted to hospital: 2/3/2018  6:22 PM        Chest pain   Assessment & Plan    Admit telemetry  Consult Cardiology  Trend troponin        * CAP (community acquired pneumonia)   Assessment & Plan    Begin azithromycin and Rocephin  Respiratory protocol  Sputum culture pending  Influenza culture pending  Begin Tamiflu        Diabetes ([address] )   Assessment & Plan    Hemoglobin A1c pending  Continue Lantus, insulin sliding scale        Hypertension   Assessment & Plan    Continue enalapril and metoprolol                    VTE Prophylaxis: Heparin  / sequential compression device   Code Status: Full  POLST: There is no POLST form on file for this patient (pre-hospital)  Discussion with family:  Family is present for the entire evaluation  Anticipated Length of Stay:  Patient will be admitted on an Inpatient basis with an anticipated length of stay of  more than 2 midnights  Justification for Hospital Stay:  Cardiac evaluation, IV antibiotics    Total Time for Visit, including Counseling / Coordination of Care: 30 minutes  Greater than 50% of this total time spent on direct patient counseling and coordination of care  Chief Complaint:   Cough and shortness of breath    History of Present Illness:    Mary Krishna is a 70 y o  male who presents with complaints of cough and shortness of breath has been present for last day  Patient states he has been nauseated with vomiting  Patient states he has chest pain  Patient denies any abdominal pain or diarrhea  Patient denies any known fever  Patient has a known cardiac history  Review of Systems:    Review of Systems   Respiratory: Positive for cough and shortness of breath  Cardiovascular: Positive for chest pain  All other systems reviewed and are negative        Past Medical and Surgical History: Past Medical History:   Diagnosis Date    [facility] mellitus [facility] )     Hyperlipidemia     Hypertension        History reviewed  No pertinent surgical history  Meds/Allergies:    Prior to Admission medications    Medication Sig Start Date End Date Taking?  Authorizing Provider   aspirin 81 mg chewable tablet Chew 81 mg daily   Yes Historical Provider, MD   atorvastatin (LIPITOR) 20 mg tablet Take 20 mg by mouth daily   Yes Historical Provider, MD   enalapril (VASOTEC) 10 mg tablet Take 10 mg by mouth daily   Yes Historical Provider, MD   famotidine (PEPCID) 20 mg tablet Take 20 mg by mouth daily   Yes Historical Provider, MD   folic acid (FOLVITE) 1 mg tablet Take by mouth daily   Yes Historical Provider, MD   gabapentin (NEURONTIN) 300 mg capsule Take 100 mg by mouth 2 (two) times a day     Yes Historical Provider, MD   glipiZIDE (GLUCOTROL) 5 mg tablet Take 5 mg by mouth 2 (two) times a day before meals   Yes Historical Provider, MD   insulin glargine (LANTUS) 100 units/mL subcutaneous injection Inject 30 Units under the skin daily   Yes Historical Provider, MD   isosorbide dinitrate (ISORDIL) 30 mg tablet Take 30 mg by mouth daily   Yes Historical Provider, MD   metFORMIN (GLUCOPHAGE) 1000 MG tablet Take 1,000 mg by mouth 2 (two) times a day with meals   Yes Historical Provider, MD   metoprolol succinate (TOPROL-XL) 25 mg 24 hr tablet Take 25 mg by mouth 2 (two) times a day   Yes Historical Provider, MD   nitroglycerin (NITRODUR) 0 2 mg/hr Place 1 patch on the skin as needed     Yes Historical Provider, MD   prasugrel (EFFIENT) tablet Take 10 mg by mouth   Yes Historical Provider, MD   sitaGLIPtin (JANUVIA) 100 mg tablet Take 100 mg by mouth daily   Yes Historical Provider, MD   cyanocobalamin (VITAMIN B-12) 1,000 mcg tablet Take by mouth daily    Historical Provider, MD   meclizine (ANTIVERT) 25 mg tablet Take 1 tablet by mouth 3 (three) times a day as needed for dizziness 10/18/17 2/3/18  Cherylin Morning L DO [place]     I have reviewed home medications with patient personally  Allergies: No Known Allergies    Social History:     Marital Status: /Civil Union   Occupation:  Retired  Patient Pre-hospital Living Situation:  Lives at home   Patient Pre-hospital Level of Mobility:  Ambulatory  Patient Pre-hospital Diet Restrictions:  Diabetic  Substance Use History:   History   Alcohol Use No     History   Smoking Status    Current Every Day Smoker    Packs/day: 0 25    Types: Cigarettes   Smokeless Tobacco    Not on file     History   Drug Use No       Family History:    History reviewed  No pertinent family history  Physical Exam:     Vitals:   Blood Pressure: 154/68 (02/03/18 2225)  Pulse: 83 (02/03/18 2225)  Temperature: 98 2 °F (36 8 °C) (02/03/18 1718)  Respirations: 18 (02/03/18 2019)  Weight - Scale: 76 5 kg (168 lb 10 4 oz) (02/03/18 2226)  SpO2: 95 % (02/03/18 2225)    Physical Exam   Constitutional: He is oriented to person, place, and time  He appears well-developed and well-nourished  HENT:   Head: Normocephalic and atraumatic  Right Ear: External ear normal    Left Ear: External ear normal    Nose: Nose normal    Mouth/Throat: Oropharynx is clear and moist    Eyes: Conjunctivae and EOM are normal  Pupils are equal, round, and reactive to light  Neck: Normal range of motion  Cardiovascular: Normal rate, regular rhythm and normal heart sounds  Pulmonary/Chest: He has wheezes  He has rhonchi  Abdominal: Soft  Bowel sounds are normal    Musculoskeletal: He exhibits no edema  Neurological: He is alert and oriented to person, place, and time  Skin: Skin is warm and dry  Psychiatric: He has a normal mood and affect  His behavior is normal  Judgment and thought content normal        Additional Data:     Lab Results: I have personally reviewed pertinent reports          Results from last 7 days  Lab Units 02/03/18 2009   WBC Thousand/uL 7 62   HEMOGLOBIN g/dL 10 5*   HEMATOCRIT % 34 6*   PLATELETS Thousands/uL 165   NEUTROS PCT % 71   LYMPHS PCT % 20   MONOS PCT % 8   EOS PCT % 0       Results from last 7 days  Lab Units 02/03/18 2008   SODIUM mmol/L 142   POTASSIUM mmol/L 4 4   CHLORIDE mmol/L 103   CO2 mmol/L 28   BUN mg/dL 28*   CREATININE mg/dL 1 32*   CALCIUM mg/dL 9 2   TOTAL PROTEIN g/dL 7 3   BILIRUBIN TOTAL mg/dL 0 40   ALK PHOS U/L 118*   ALT U/L 17   AST U/L 18   GLUCOSE RANDOM mg/dL 116           Imaging: I have personally reviewed pertinent films in PACS     Chest x-ray was reviewed by myself and shows suggestion of a right middle lobe infiltrate    XR chest 2 views    (Results Pending)       EKG, Pathology, and Other Studies Reviewed on Admission:   · EKG:  Normal sinus rhythm, unchanged from previous EKG    Allscripts / Epic Records Reviewed: Yes     ** Please Note: This note has been constructed using a voice recognition system   **

## 2018-02-04 NOTE — ED PROVIDER NOTES
History  Chief Complaint   Patient presents with    Weakness - Generalized     patient presents to the ED with c/o generalized weakness, abdominal pain, and cough x2 days  This is a 41-year-old male patient presents to the ER for evaluation of a cough and generalized weakness  States symptoms began yesterday  Has a wet productive cough for thick yellow sputum  Has generalized body aches  Shortness of breath with this  States at times he feels like he has to stop and catch his breath  Chills but no recorded fever  2-3 episodes of vomiting  Has not had anything to eat or drink since yesterday  Vomiting is described as stomach contents alone, nonbloody nonbilious  Normal bowel movements  Did have burning with urination yesterday but that resolved  He was recently in the hospital visiting family members who are sick including those with influenza          History provided by:  Patient   used: No    Fatigue   Severity:  Moderate  Onset quality:  Sudden  Duration:  1 day  Timing:  Intermittent  Progression:  Waxing and waning  Chronicity:  New  Context: not alcohol use, not allergies, not change in medication, not decreased sleep, not dehydration, not drug use, not increased activity, not pinched nerve, not recent infection, not stress and not urinary tract infection    Relieved by:  Nothing  Worsened by:  Nothing  Ineffective treatments:  None tried  Associated symptoms: abdominal pain (after vomiting), myalgias and nausea    Associated symptoms: no anorexia, no aphasia, no arthralgias, no ataxia, no chest pain, no cough, no diarrhea, no difficulty walking, no dizziness, no drooling, no dysphagia, no dysuria, no numbness in extremities, no falls, no fever, no foul-smelling urine, no frequency, no headaches, no hematochezia, no lethargy, no loss of consciousness, no melena, no near-syncope, no seizures, no sensory-motor deficit, no shortness of breath, no stroke symptoms, no syncope, no urgency, no vision change and no vomiting    Abdominal pain:     Location:  Epigastric    Quality: burning      Quality comment:  After vomiting    Severity:  Mild    Onset quality:  Gradual    Duration:  1 day    Timing:  Intermittent    Progression:  Waxing and waning    Chronicity:  New  Myalgias:     Location:  Generalized    Quality:  Aching    Severity:  Mild    Onset quality:  Gradual    Duration:  1 day    Timing:  Constant    Progression:  Waxing and waning  Nausea:     Severity:  Mild    Onset quality:  Gradual    Duration:  1 day    Timing:  Intermittent    Progression:  Waxing and waning  Risk factors: coronary artery disease, diabetes and heart disease    Risk factors: no anemia, no congestive heart failure, no excessive menstruation, no family hx of stroke, no neurologic disease, no new medications and no recent stressors        Prior to Admission Medications   Prescriptions Last Dose Informant Patient Reported?  Taking?   aspirin 81 mg chewable tablet   Yes Yes   Sig: Chew 81 mg daily   atorvastatin (LIPITOR) 20 mg tablet   Yes Yes   Sig: Take 20 mg by mouth daily   cyanocobalamin (VITAMIN B-12) 1,000 mcg tablet More than a month at Unknown time  Yes No   Sig: Take by mouth daily   enalapril (VASOTEC) 10 mg tablet   Yes Yes   Sig: Take 10 mg by mouth daily   famotidine (PEPCID) 20 mg tablet   Yes Yes   Sig: Take 20 mg by mouth daily   folic acid (FOLVITE) 1 mg tablet   Yes Yes   Sig: Take by mouth daily   gabapentin (NEURONTIN) 300 mg capsule   Yes Yes   Sig: Take 100 mg by mouth 2 (two) times a day     glipiZIDE (GLUCOTROL) 5 mg tablet   Yes Yes   Sig: Take 5 mg by mouth 2 (two) times a day before meals   insulin glargine (LANTUS) 100 units/mL subcutaneous injection   Yes Yes   Sig: Inject 30 Units under the skin daily   isosorbide dinitrate (ISORDIL) 30 mg tablet   Yes Yes   Sig: Take 30 mg by mouth daily   metFORMIN (GLUCOPHAGE) 1000 MG tablet   Yes Yes   Sig: Take 1,000 mg by mouth 2 (two) times a day with meals   metoprolol succinate (TOPROL-XL) 25 mg 24 hr tablet   Yes Yes   Sig: Take 25 mg by mouth 2 (two) times a day   nitroglycerin (NITRODUR) 0 2 mg/hr   Yes Yes   Sig: Place 1 patch on the skin as needed     prasugrel (EFFIENT) tablet   Yes Yes   Sig: Take 10 mg by mouth   sitaGLIPtin (JANUVIA) 100 mg tablet   Yes Yes   Sig: Take 100 mg by mouth daily      Facility-Administered Medications: None       Past Medical History:   Diagnosis Date    Diabetes mellitus ([facility] [address] )     Hyperlipidemia     Hypertension        History reviewed  No pertinent surgical history  History reviewed  No pertinent family history  I have reviewed and agree with the history as documented  Social History   Substance Use Topics    Smoking status: Current Every Day Smoker     Packs/day: 0 25     Types: Cigarettes    Smokeless tobacco: Not on file    Alcohol use No        Review of Systems   Constitutional: Positive for fatigue  Negative for activity change, appetite change, chills, diaphoresis, fever and unexpected weight change  HENT: Negative for congestion, drooling, rhinorrhea, sinus pressure, sore throat and trouble swallowing  Eyes: Negative for photophobia and visual disturbance  Respiratory: Negative for apnea, cough, choking, chest tightness, shortness of breath, wheezing and stridor  Cardiovascular: Negative for chest pain, palpitations, leg swelling, syncope and near-syncope  Gastrointestinal: Positive for abdominal pain (after vomiting) and nausea  Negative for abdominal distention, anorexia, blood in stool, constipation, diarrhea, dysphagia, hematochezia, melena and vomiting  Genitourinary: Negative for decreased urine volume, difficulty urinating, dysuria, enuresis, flank pain, frequency, hematuria and urgency  Musculoskeletal: Positive for myalgias  Negative for arthralgias, falls, neck pain and neck stiffness  Skin: Negative for color change, pallor, rash and wound  Allergic/Immunologic: Negative  Neurological: Negative for dizziness, tremors, seizures, loss of consciousness, syncope, weakness, light-headedness, numbness and headaches  Hematological: Negative  Psychiatric/Behavioral: Negative  All other systems reviewed and are negative  Physical Exam  ED Triage Vitals   Temperature Pulse Respirations Blood Pressure SpO2   02/03/18 1718 02/03/18 1718 02/03/18 1718 02/03/18 1718 02/03/18 1718   98 2 °F (36 8 °C) 69 18 121/60 97 %      Temp Source Heart Rate Source Patient Position - Orthostatic VS BP Location FiO2 (%)   02/03/18 2341 02/03/18 2019 02/03/18 2019 02/03/18 2019 --   Oral Monitor Sitting Right arm       Pain Score       02/03/18 2019       No Pain           Orthostatic Vital Signs  Vitals:    02/04/18 0802 02/04/18 1209 02/04/18 1636 02/04/18 1926   BP: (!) 178/66 133/60 130/80 135/80   Pulse: 72 68 65 66   Patient Position - Orthostatic VS: Lying Lying Sitting Lying       Physical Exam   Constitutional: He is oriented to person, place, and time  He appears well-developed and well-nourished  Non-toxic appearance  He does not have a sickly appearance  He appears ill  No distress  Appears pale and dehydrated   HENT:   Head: Normocephalic and atraumatic  Eyes: EOM and lids are normal  Pupils are equal, round, and reactive to light  Neck: Normal range of motion  Neck supple  Cardiovascular: Normal rate, regular rhythm, S1 normal, S2 normal, normal heart sounds, intact distal pulses and normal pulses  Exam reveals no gallop, no distant heart sounds, no friction rub and no decreased pulses  No murmur heard  Pulses:       Radial pulses are 2+ on the right side, and 2+ on the left side  Pulmonary/Chest: Effort normal and breath sounds normal  No accessory muscle usage  No apnea, no tachypnea and no bradypnea  No respiratory distress  He has no decreased breath sounds  He has no wheezes  He has no rhonchi  He has no rales     Mild transmitted upper airway sounds, no obvious wheezes rales rhonchi  Abdominal: Soft  Normal appearance and bowel sounds are normal  He exhibits no distension and no mass  There is no tenderness  There is no rigidity, no rebound and no guarding  No hernia  No abdominal tenderness  No distension  No rebound or guarding  Musculoskeletal: Normal range of motion  He exhibits no edema, tenderness or deformity  Neurological: He is alert and oriented to person, place, and time  No cranial nerve deficit  GCS eye subscore is 4  GCS verbal subscore is 5  GCS motor subscore is 6  GCS 15  AAOx3  Ambulating in department without difficulty  CN II-XII grossly intact  No focal neuro deficits  Skin: Skin is warm, dry and intact  No rash noted  He is not diaphoretic  No erythema  No pallor  Psychiatric: His speech is normal    Nursing note and vitals reviewed        ED Medications  Medications   aspirin chewable tablet 81 mg (81 mg Oral Given 2/4/18 0929)   atorvastatin (LIPITOR) tablet 20 mg (20 mg Oral Given 2/4/18 0929)   cyanocobalamin (VITAMIN B-12) tablet 1,000 mcg (1,000 mcg Oral Given 2/4/18 0928)   enalapril (VASOTEC) tablet 10 mg (10 mg Oral Given 2/4/18 0929)   famotidine (PEPCID) tablet 20 mg (20 mg Oral Given 6/0/68 8201)   folic acid (FOLVITE) tablet 1 mg (1 mg Oral Given 2/4/18 0929)   gabapentin (NEURONTIN) capsule 100 mg (100 mg Oral Given 2/4/18 1712)   insulin glargine (LANTUS) subcutaneous injection 30 Units (30 Units Subcutaneous Given 2/4/18 0930)   isosorbide dinitrate (ISORDIL) tablet 30 mg (30 mg Oral Given 2/4/18 0929)   metoprolol succinate (TOPROL-XL) 24 hr tablet 25 mg (25 mg Oral Given 2/4/18 1712)   prasugrel (EFFIENT) tablet 10 mg (10 mg Oral Given 2/4/18 0929)   ondansetron (ZOFRAN) injection 4 mg (not administered)   acetaminophen (TYLENOL) tablet 650 mg (650 mg Oral Given 2/4/18 0929)   nicotine (NICODERM CQ) 14 mg/24hr TD 24 hr patch 1 patch (1 patch Transdermal Medication Applied 2/4/18 0929) insulin lispro (HumaLOG) 100 units/mL subcutaneous injection 1-6 Units (3 Units Subcutaneous Given 2/4/18 1529)   insulin lispro (HumaLOG) 100 units/mL subcutaneous injection 1-6 Units (1 Units Subcutaneous Not Given 2/4/18 2140)   cefTRIAXone (ROCEPHIN) 1,000 mg in dextrose 5 % 50 mL IVPB (0 mg Intravenous Hold 2/4/18 0122)     And   azithromycin (ZITHROMAX) 500 mg in sodium chloride 0 9% 250mL IVPB 500 mg (0 mg Intravenous Stopped 2/4/18 0302)   sodium chloride 0 9 % infusion (75 mL/hr Intravenous New Bag 2/4/18 0008)   heparin (porcine) subcutaneous injection 5,000 Units (5,000 Units Subcutaneous Given 2/4/18 2140)   ipratropium (ATROVENT) 0 02 % inhalation solution 0 5 mg (0 5 mg Nebulization Given 2/4/18 2031)   levalbuterol (XOPENEX) inhalation solution 1 25 mg (1 25 mg Nebulization Given 2/4/18 2031)   albuterol inhalation solution 2 5 mg (not administered)   oseltamivir (TAMIFLU) capsule 75 mg (75 mg Oral Given 2/4/18 2140)   dextromethorphan-guaiFENesin (ROBITUSSIN DM)  mg/5 mL oral syrup 10 mL (10 mL Oral Given 2/4/18 1952)   hydrALAZINE (APRESOLINE) injection 10 mg (0 mg Intravenous Return to [facility] 2/4/18 1256)   sodium chloride 0 9 % bolus 1,000 mL (0 mL Intravenous Stopped 2/3/18 2224)   ondansetron (ZOFRAN) injection 4 mg (4 mg Intravenous Given 2/3/18 2012)   albuterol inhalation solution 5 mg (5 mg Nebulization Given 2/3/18 2015)   ipratropium (ATROVENT) 0 02 % inhalation solution 0 5 mg (0 5 mg Nebulization Given 2/3/18 2016)   cefTRIAXone (ROCEPHIN) IVPB (premix) 1,000 mg (1,000 mg Intravenous New Bag 2/4/18 0119)       Diagnostic Studies  Results Reviewed     Procedure Component Value Units Date/Time    Lactic acid, plasma [[id number]]  (Normal) Collected:  02/03/18 2009    Lab Status:  Final result Specimen:  Blood from Arm, Left Updated:  02/03/18 2055     LACTIC ACID 2 0 mmol/L     Narrative:         Result may be elevated if tourniquet was used during collection      Troponin I [[id number]]  (Normal) Collected:  02/03/18 2009    Lab Status:  Final result Specimen:  Blood from Arm, Left Updated:  02/03/18 2046     Troponin I 0 03 ng/mL     Narrative:         [employer] Chemistry analyzer 99% cutoff is > 0 04 ng/mL in network labs    o cTnI 99% cutoff is useful only when applied to patients in the clinical setting of myocardial ischemia  o cTnI 99% cutoff should be interpreted in the context of clinical history, ECG findings and possibly cardiac imaging to establish correct diagnosis  o cTnI 99% cutoff may be suggestive but clearly not indicative of a coronary event without the clinical setting of myocardial ischemia  Hepatic function panel [[id number]]  (Abnormal) Collected:  02/03/18 2008    Lab Status:  Final result Specimen:  Blood from Arm, Left Updated:  02/03/18 2045     Total Bilirubin 0 40 mg/dL      Bilirubin, Direct 0 17 mg/dL      Alkaline Phosphatase 118 (H) U/L      AST 18 U/L      ALT 17 U/L      Total Protein 7 3 g/dL      Albumin 3 4 (L) g/dL     Basic metabolic panel [[id number]]  (Abnormal) Collected:  02/03/18 2008    Lab Status:  Final result Specimen:  Blood from Arm, Left Updated:  02/03/18 2045     Sodium 142 mmol/L      Potassium 4 4 mmol/L      Chloride 103 mmol/L      CO2 28 mmol/L      Anion Gap 11 mmol/L      BUN 28 (H) mg/dL      Creatinine 1 32 (H) mg/dL      Glucose 116 mg/dL      Calcium 9 2 mg/dL      eGFR 55 ml/min/1 73sq m     Narrative:         National Kidney Disease Education Program recommendations are as follows:  GFR calculation is accurate only with a steady state creatinine  Chronic Kidney disease less than 60 ml/min/1 73 sq  meters  Kidney failure less than 15 ml/min/1 73 sq  meters      CBC and differential [[id number]]  (Abnormal) Collected:  02/03/18 2009    Lab Status:  Final result Specimen:  Blood from Arm, Left Updated:  02/03/18 2024     WBC 7 62 Thousand/uL      RBC 4 21 Million/uL      Hemoglobin 10 5 (L) g/dL      Hematocrit 34 6 (L) %      MCV 82 fL      MCH 24 9 (L) pg      MCHC 30 3 (L) g/dL      RDW 14 6 %      MPV 11 9 fL      Platelets 317 Thousands/uL      nRBC 0 /100 WBCs      Neutrophils Relative 71 %      Lymphocytes Relative 20 %      Monocytes Relative 8 %      Eosinophils Relative 0 %      Basophils Relative 0 %      Neutrophils Absolute 5 39 Thousands/µL      Lymphocytes Absolute 1 52 Thousands/µL      Monocytes Absolute 0 64 Thousand/µL      Eosinophils Absolute 0 02 Thousand/µL      Basophils Absolute 0 03 Thousands/µL     POCT urinalysis dipstick [[id number]]  (Abnormal) Resulted:  02/03/18 2003    Lab Status:  Final result Specimen:  Urine Updated:  02/03/18 2004     Color, UA Ivory     Clarity, UA Hazy     EXT Glucose, UA Negative     EXT Bilirubin, UA (Ref: Negative) Small     EXT Ketones, UA (Ref: Negative) Negative     EXT Spec Grav, UA 1 030     EXT Blood, UA (Ref: Negative) Negative     EXT pH, UA 5 0     EXT Protein, UA (Ref: Negative) 100++     EXT Urobilinogen, UA (Ref: 0 2- 1 0) 0 2     EXT Leukocytes, UA (Ref: Negative) Negative     EXT Nitrite, UA (Ref: Negative) Negative                 XR chest 2 views   Final Result by Raffaele Ward MD (02/04 1007)      No acute cardiopulmonary disease, stable                     Workstation performed: [id number]                    Procedures  ECG 12 Lead Documentation  Date/Time: 2/3/2018 7:53 PM  Performed by: Alberto Zelaay  Authorized by: Harika Hopson     Indications / Diagnosis:  Gen weakness  ECG reviewed by me, the ED Provider: yes    Patient location:  ED  Previous ECG:     Previous ECG:  Compared to current    Comparison ECG info:   Oct 18 2017    Similarity:  No change    Comparison to cardiac monitor: Yes    Interpretation:     Interpretation: normal    Quality:     Tracing quality:  Limited by artifact  Rate:     ECG rate:  72    ECG rate assessment: normal    Rhythm:     Rhythm: sinus rhythm    Ectopy:     Ectopy: none    QRS:     QRS axis:  Normal    QRS intervals: Normal  Conduction:     Conduction: normal    ST segments:     ST segments:  Normal  T waves:     T waves: normal             Phone Contacts  ED Phone Contact    ED Course  ED Course          HEART Risk Score    Flowsheet Row Most Recent Value   History  0 Filed at: 02/04/2018 2339   ECG  1 Filed at: 02/04/2018 2339   Age  2 Filed at: 02/04/2018 2339   Risk Factors  2 Filed at: 02/04/2018 2339   Troponin  0 Filed at: 02/04/2018 2339   Heart Score Risk Calculator   History  0 Filed at: 02/04/2018 2339   ECG  1 Filed at: 02/04/2018 2339   Age  2 Filed at: 02/04/2018 2339   Risk Factors  2 Filed at: 02/04/2018 2339   Troponin  0 Filed at: 02/04/2018 2339   HEART Score  5 Filed at: 02/04/2018 2339   HEART Score  5 Filed at: 02/04/2018 2339                            MDM  Number of Diagnoses or Management Options  Shortness of breath: new and requires workup  Diagnosis management comments: DDX including but not limited to: pneumonia, pleural effusion, CHF, PE, PTX, ACS, MI, asthma exacerbation, COPD exacerbation, anemia, metabolic abnormality, renal failure  Plan:  Cardiac workup, chest x-ray  Concern for cardiac versus infectious etiology including pneumonia versus bronchitis  Amount and/or Complexity of Data Reviewed  Clinical lab tests: ordered and reviewed  Tests in the radiology section of CPT®: ordered and reviewed  Independent visualization of images, tracings, or specimens: yes    Risk of Complications, Morbidity, and/or Mortality  Presenting problems: moderate  Management options: low  General comments: 76-year-old male with cough and shortness of breath  Clinically appears dehydrated  Feels better after IV fluids and antiemetic  He does have some nonspecific changes on his EKG  He does have mild acute kidney injury  Discussed with Internal Medicine  There is concern for questionable infiltrate on x-ray  Will treat for pneumonia at this time  Send off influenza    Admit for cardiac observation as well  Patient is in agreement with the plan  Will continue fluid hydration  He is stable at the time of admission  His questions were answered to the best of my ability  Patient Progress  Patient progress: stable    CritCare Time    Disposition  Final diagnoses:   Shortness of breath     Time reflects when diagnosis was documented in both MDM as applicable and the Disposition within this note     Time User Action Codes Description Comment    2/3/2018 10:51 PM Guy Early Add [R06 02] Shortness of breath     2/3/2018 10:53 PM Estuardogonzalo Cook Add [R07 9] Chest pain     2/4/2018 12:43 AM Elaina Sanchez Add [J18 9] CAP (community acquired pneumonia)     2/4/2018 12:43 AM Elaina Sanchez Add [I10] Essential hypertension     2/4/2018 12:43 AM Elaina Sanchez Add [E11 9] Diabetes [facility])       ED Disposition     ED Disposition Condition Comment    Admit  Case was discussed with José Miguel Husbands and the patient's admission status was agreed to be Admission Status: inpatient status to the service of Dr Ce Victor          Follow-up Information    None       Current Discharge Medication List      CONTINUE these medications which have NOT CHANGED    Details   aspirin 81 mg chewable tablet Chew 81 mg daily      atorvastatin (LIPITOR) 20 mg tablet Take 20 mg by mouth daily      enalapril (VASOTEC) 10 mg tablet Take 10 mg by mouth daily      famotidine (PEPCID) 20 mg tablet Take 20 mg by mouth daily      folic acid (FOLVITE) 1 mg tablet Take by mouth daily      gabapentin (NEURONTIN) 300 mg capsule Take 100 mg by mouth 2 (two) times a day        glipiZIDE (GLUCOTROL) 5 mg tablet Take 5 mg by mouth 2 (two) times a day before meals      insulin glargine (LANTUS) 100 units/mL subcutaneous injection Inject 30 Units under the skin daily      isosorbide dinitrate (ISORDIL) 30 mg tablet Take 30 mg by mouth daily      metFORMIN (GLUCOPHAGE) 1000 MG tablet Take 1,000 mg by mouth 2 (two) times a day with meals      metoprolol succinate (TOPROL-XL) 25 mg 24 hr tablet Take 25 mg by mouth 2 (two) times a day      nitroglycerin (NITRODUR) 0 2 mg/hr Place 1 patch on the skin as needed        prasugrel (EFFIENT) tablet Take 10 mg by mouth      sitaGLIPtin (JANUVIA) 100 mg tablet Take 100 mg by mouth daily      cyanocobalamin (VITAMIN B-12) 1,000 mcg tablet Take by mouth daily             Outpatient Discharge Orders  Room Service         ED Provider  Electronically Signed by           Alejandro Estes PA-C  02/04/18 9180

## 2018-02-04 NOTE — ED NOTES
Pt c/o CP that does not radiate, denies SOB, denies N/V, provider made aware       Trudy Denis RN  02/03/18 2120       Trudy Denis RN  02/03/18 4688

## 2018-02-04 NOTE — ASSESSMENT & PLAN NOTE
Begin azithromycin and Rocephin  Respiratory protocol  Sputum culture pending  Influenza culture pending  Begin Tamiflu

## 2018-02-04 NOTE — PROGRESS NOTES
[facility] Progress Note  Patient: Dakota Farr 70 y o  male   MRN: [id number]  PCP: Kirsten Haeys  Unit/Bed#: -01 Encounter: [id number]  Date Of Visit: 18    Assessment:    Principal Problem:    CAP (community acquired pneumonia)  Active Problems:    Chest pain    Hypertension    Diabetes ([facility] [address] )      Plan:    · 1  SOB- secondary to CAP- on azithromycin and ceftriaxone  Will r/o flu  · 2  History of CABG- no chest pain  Cardiac enzymes negative  Cardiology consult discontinued  · 3  HTN- on enalapril, metoprolol and isordil  · 4  DM- on ISS  · 5  MINDY- improved with IVF  ·        VTE Pharmacologic Prophylaxis:   Pharmacologic: Heparin  Mechanical VTE Prophylaxis in Place: Yes    Patient Centered Rounds: I have performed bedside rounds with nursing staff today  Discussions with Specialists or Other Care Team Provider:     Education and Discussions with Family / Patient:     Time Spent for Care: 20 minutes  More than 50% of total time spent on counseling and coordination of care as described above  Current Length of Stay: 1 day(s)    Current Patient Status: Inpatient   Certification Statement: The patient will continue to require additional inpatient hospital stay due to SOB, pneumonia    Discharge Plan / Estimated Discharge Date: Once SOB improves      Code Status: Level 1 - Full Code      Subjective:   Patient seen and examined at bedside  Patient complaints of cough  Objective:     Vitals:   Temp (24hrs), Av 5 °F (37 5 °C), Min:98 2 °F (36 8 °C), Max:100 6 °F (38 1 °C)    HR:  [69-84] 72  Resp:  [18-20] 20  BP: (121-178)/(60-72) 178/66  SpO2:  [91 %-97 %] 93 %  Body mass index is 25 64 kg/m²  Input and Output Summary (last 24 hours):        Intake/Output Summary (Last 24 hours) at 18 1043  Last data filed at 18 0802   Gross per 24 hour   Intake             3120 ml   Output              520 ml   Net             2600 ml       Physical Exam: Physical Exam   Constitutional: He is oriented to person, place, and time  He appears well-developed and well-nourished  HENT:   Head: Normocephalic and atraumatic  Eyes: Conjunctivae and EOM are normal  Pupils are equal, round, and reactive to light  Neck: Normal range of motion  Neck supple  No JVD present  No tracheal deviation present  No thyromegaly present  Cardiovascular: Normal rate and regular rhythm  Exam reveals no gallop and no friction rub  Murmur heard  Pulmonary/Chest: No respiratory distress  He has no wheezes  He has no rales  Decreased air entry at B/L bases  Midline CABG scar   Abdominal: Soft  Bowel sounds are normal  He exhibits no distension  There is no tenderness  There is no rebound  Musculoskeletal: Normal range of motion  He exhibits no edema  Neurological: He is alert and oriented to person, place, and time  Vitals reviewed  Additional Data:     Labs:      Results from last 7 days  Lab Units 02/04/18  0506 02/03/18 2009   WBC Thousand/uL 5 93 7 62   HEMOGLOBIN g/dL 9 6* 10 5*   HEMATOCRIT % 31 4* 34 6*   PLATELETS Thousands/uL 131* 165   NEUTROS PCT %  --  71   LYMPHS PCT %  --  20   MONOS PCT %  --  8   EOS PCT %  --  0       Results from last 7 days  Lab Units 02/04/18  0506 02/03/18 2008   SODIUM mmol/L 140 142   POTASSIUM mmol/L 4 0 4 4   CHLORIDE mmol/L 105 103   CO2 mmol/L 26 28   BUN mg/dL 22 28*   CREATININE mg/dL 1 11 1 32*   CALCIUM mg/dL 8 3 9 2   TOTAL PROTEIN g/dL  --  7 3   BILIRUBIN TOTAL mg/dL  --  0 40   ALK PHOS U/L  --  118*   ALT U/L  --  17   AST U/L  --  18   GLUCOSE RANDOM mg/dL 122 116           * I Have Reviewed All Lab Data Listed Above  * Additional Pertinent Lab Tests Reviewed:  [place] [address] Admission Reviewed    Imaging:    Imaging Reports Reviewed Today Include:   Imaging Personally Reviewed by Myself Includes:      Recent Cultures (last 7 days):           Last 24 Hours Medication List:     Current Facility-Administered Medications:  acetaminophen 650 mg Oral Q6H PRN Marie Mouse, PA-C    albuterol 2 5 mg Nebulization Q4H PRN Jj Hugo MD    aspirin 81 mg Oral Daily Marie Mouse, PA-C    atorvastatin 20 mg Oral Daily Marie Mouse, PA-C    cefTRIAXone 1,000 mg Intravenous Q24H Marie Mouse, PA-C Last Rate: Stopped (02/04/18 0122)   And        azithromycin 500 mg Intravenous Q24H Marie Mouse, PA-C Last Rate: Stopped (02/04/18 0302)   cyanocobalamin 1,000 mcg Oral Daily Marie Mouse, PA-C    dextromethorphan-guaiFENesin 10 mL Oral Q4H PRN Jj Hugo MD    enalapril 10 mg Oral Daily Marie Mouse, PA-C    famotidine 20 mg Oral Daily Marie Mouse, PA-C    folic acid 1 mg Oral Daily Marie Mouse, PA-C    gabapentin 100 mg Oral BID Marie Mouse, PA-C    heparin (porcine) 5,000 Units Subcutaneous [facility] Marie Mouse, PA-C    insulin glargine 30 Units Subcutaneous Daily Marie Mouse, PA-C    insulin lispro 1-6 Units Subcutaneous TID AC Marie Mouse, PA-C    insulin lispro 1-6 Units Subcutaneous HS Marie Mouse, PA-C    ipratropium 0 5 mg Nebulization TID Puneet Bolaños MD    isosorbide dinitrate 30 mg Oral Daily Marie Mouse, PA-C    levalbuterol 1 25 mg Nebulization TID Puneet Bolaños MD    metoprolol succinate 25 mg Oral BID Marie Mouse, PA-C    nicotine 1 patch Transdermal Daily Marie Mouse, PA-C    ondansetron 4 mg Intravenous Q6H PRN Marie Mouse, PA-C    oseltamivir 75 mg Oral Q12H [address] Jj Hugo MD    prasugrel 10 mg Oral Daily Marie Mouse, PA-C    sodium chloride 75 mL/hr Intravenous Continuous Marie Mouse, PA-C Last Rate: 75 mL/hr (02/04/18 0008)        Today, Patient Was Seen By: Jj Hugo MD    ** Please Note: This note has been constructed using a voice recognition system   **

## 2018-02-05 VITALS
TEMPERATURE: 99.2 F | HEIGHT: 68 IN | SYSTOLIC BLOOD PRESSURE: 190 MMHG | WEIGHT: 168.65 LBS | DIASTOLIC BLOOD PRESSURE: 80 MMHG | OXYGEN SATURATION: 94 % | BODY MASS INDEX: 25.56 KG/M2 | HEART RATE: 84 BPM | RESPIRATION RATE: 18 BRPM

## 2018-02-05 PROBLEM — J10.1 INFLUENZA A: Status: ACTIVE | Noted: 2018-02-05

## 2018-02-05 LAB
ANION GAP SERPL CALCULATED.3IONS-SCNC: 8 MMOL/L (ref 4–13)
BASOPHILS # BLD AUTO: 0.02 THOUSANDS/ΜL (ref 0–0.1)
BASOPHILS NFR BLD AUTO: 0 % (ref 0–1)
BUN SERPL-MCNC: 15 MG/DL (ref 5–25)
CALCIUM SERPL-MCNC: 8.1 MG/DL (ref 8.3–10.1)
CHLORIDE SERPL-SCNC: 106 MMOL/L (ref 100–108)
CO2 SERPL-SCNC: 25 MMOL/L (ref 21–32)
CREAT SERPL-MCNC: 1.08 MG/DL (ref 0.6–1.3)
EOSINOPHIL # BLD AUTO: 0.02 THOUSAND/ΜL (ref 0–0.61)
EOSINOPHIL NFR BLD AUTO: 0 % (ref 0–6)
ERYTHROCYTE [DISTWIDTH] IN BLOOD BY AUTOMATED COUNT: 14.7 % (ref 11.6–15.1)
GFR SERPL CREATININE-BSD FRML MDRD: 70 ML/MIN/1.73SQ M
GLUCOSE SERPL-MCNC: 106 MG/DL (ref 65–140)
GLUCOSE SERPL-MCNC: 106 MG/DL (ref 65–140)
GLUCOSE SERPL-MCNC: 178 MG/DL (ref 65–140)
HCT VFR BLD AUTO: 28.8 % (ref 36.5–49.3)
HGB BLD-MCNC: 9 G/DL (ref 12–17)
LYMPHOCYTES # BLD AUTO: 1.44 THOUSANDS/ΜL (ref 0.6–4.47)
LYMPHOCYTES NFR BLD AUTO: 19 % (ref 14–44)
MCH RBC QN AUTO: 25.2 PG (ref 26.8–34.3)
MCHC RBC AUTO-ENTMCNC: 31.3 G/DL (ref 31.4–37.4)
MCV RBC AUTO: 81 FL (ref 82–98)
MONOCYTES # BLD AUTO: 0.57 THOUSAND/ΜL (ref 0.17–1.22)
MONOCYTES NFR BLD AUTO: 8 % (ref 4–12)
NEUTROPHILS # BLD AUTO: 5.43 THOUSANDS/ΜL (ref 1.85–7.62)
NEUTS SEG NFR BLD AUTO: 72 % (ref 43–75)
NRBC BLD AUTO-RTO: 0 /100 WBCS
PLATELET # BLD AUTO: 118 THOUSANDS/UL (ref 149–390)
PMV BLD AUTO: 11 FL (ref 8.9–12.7)
POTASSIUM SERPL-SCNC: 4 MMOL/L (ref 3.5–5.3)
RBC # BLD AUTO: 3.57 MILLION/UL (ref 3.88–5.62)
SODIUM SERPL-SCNC: 139 MMOL/L (ref 136–145)
WBC # BLD AUTO: 7.5 THOUSAND/UL (ref 4.31–10.16)

## 2018-02-05 PROCEDURE — 80048 BASIC METABOLIC PNL TOTAL CA: CPT | Performed by: INTERNAL MEDICINE

## 2018-02-05 PROCEDURE — 82948 REAGENT STRIP/BLOOD GLUCOSE: CPT

## 2018-02-05 PROCEDURE — 94760 N-INVAS EAR/PLS OXIMETRY 1: CPT

## 2018-02-05 PROCEDURE — 85025 COMPLETE CBC W/AUTO DIFF WBC: CPT | Performed by: INTERNAL MEDICINE

## 2018-02-05 PROCEDURE — 99239 HOSP IP/OBS DSCHRG MGMT >30: CPT | Performed by: INTERNAL MEDICINE

## 2018-02-05 PROCEDURE — 94640 AIRWAY INHALATION TREATMENT: CPT

## 2018-02-05 RX ORDER — AZITHROMYCIN 250 MG/1
TABLET, FILM COATED ORAL
Qty: 4 TABLET | Refills: 0 | Status: SHIPPED | OUTPATIENT
Start: 2018-02-05 | End: 2018-02-08

## 2018-02-05 RX ORDER — HYDROCODONE POLISTIREX AND CHLORPHENIRAMINE POLISTIREX 10; 8 MG/5ML; MG/5ML
5 SUSPENSION, EXTENDED RELEASE ORAL EVERY 12 HOURS PRN
Qty: 120 ML | Refills: 0 | Status: SHIPPED | OUTPATIENT
Start: 2018-02-05 | End: 2018-02-15

## 2018-02-05 RX ORDER — OSELTAMIVIR PHOSPHATE 75 MG/1
75 CAPSULE ORAL EVERY 12 HOURS SCHEDULED
Qty: 8 CAPSULE | Refills: 0 | Status: SHIPPED | OUTPATIENT
Start: 2018-02-05 | End: 2018-02-09

## 2018-02-05 RX ORDER — LABETALOL 100 MG/1
200 TABLET, FILM COATED ORAL ONCE
Status: COMPLETED | OUTPATIENT
Start: 2018-02-05 | End: 2018-02-05

## 2018-02-05 RX ADMIN — ASPIRIN 81 MG 81 MG: 81 TABLET ORAL at 09:00

## 2018-02-05 RX ADMIN — NICOTINE 1 PATCH: 14 PATCH TRANSDERMAL at 09:07

## 2018-02-05 RX ADMIN — CYANOCOBALAMIN TAB 500 MCG 1000 MCG: 500 TAB at 09:01

## 2018-02-05 RX ADMIN — LEVALBUTEROL HYDROCHLORIDE 1.25 MG: 1.25 SOLUTION, CONCENTRATE RESPIRATORY (INHALATION) at 07:29

## 2018-02-05 RX ADMIN — AZITHROMYCIN MONOHYDRATE 500 MG: 500 INJECTION, POWDER, LYOPHILIZED, FOR SOLUTION INTRAVENOUS at 01:00

## 2018-02-05 RX ADMIN — FAMOTIDINE 20 MG: 20 TABLET, FILM COATED ORAL at 09:01

## 2018-02-05 RX ADMIN — LEVALBUTEROL HYDROCHLORIDE 1.25 MG: 1.25 SOLUTION, CONCENTRATE RESPIRATORY (INHALATION) at 13:16

## 2018-02-05 RX ADMIN — IPRATROPIUM BROMIDE 0.5 MG: 0.5 SOLUTION RESPIRATORY (INHALATION) at 13:16

## 2018-02-05 RX ADMIN — METOPROLOL SUCCINATE 25 MG: 25 TABLET, FILM COATED, EXTENDED RELEASE ORAL at 09:00

## 2018-02-05 RX ADMIN — HEPARIN SODIUM 5000 UNITS: 5000 INJECTION, SOLUTION INTRAVENOUS; SUBCUTANEOUS at 06:05

## 2018-02-05 RX ADMIN — PRASUGREL HYDROCHLORIDE 10 MG: 10 TABLET, FILM COATED ORAL at 09:01

## 2018-02-05 RX ADMIN — ENALAPRIL MALEATE 10 MG: 10 TABLET ORAL at 09:01

## 2018-02-05 RX ADMIN — Medication 1000 MG: at 00:29

## 2018-02-05 RX ADMIN — FOLIC ACID 1 MG: 1 TABLET ORAL at 09:00

## 2018-02-05 RX ADMIN — OSELTAMIVIR PHOSPHATE 75 MG: 75 CAPSULE ORAL at 09:01

## 2018-02-05 RX ADMIN — ISOSORBIDE DINITRATE 30 MG: 10 TABLET ORAL at 09:01

## 2018-02-05 RX ADMIN — INSULIN GLARGINE 30 UNITS: 100 INJECTION, SOLUTION SUBCUTANEOUS at 09:06

## 2018-02-05 RX ADMIN — ATORVASTATIN CALCIUM 20 MG: 20 TABLET, FILM COATED ORAL at 09:01

## 2018-02-05 RX ADMIN — IPRATROPIUM BROMIDE 0.5 MG: 0.5 SOLUTION RESPIRATORY (INHALATION) at 07:29

## 2018-02-05 RX ADMIN — INSULIN LISPRO 1 UNITS: 100 INJECTION, SOLUTION INTRAVENOUS; SUBCUTANEOUS at 12:41

## 2018-02-05 RX ADMIN — LABETALOL HYDROCHLORIDE 200 MG: 100 TABLET, FILM COATED ORAL at 13:06

## 2018-02-05 RX ADMIN — ACETAMINOPHEN 650 MG: 325 TABLET ORAL at 02:58

## 2018-02-05 RX ADMIN — GABAPENTIN 100 MG: 100 CAPSULE ORAL at 09:01

## 2018-02-05 NOTE — PLAN OF CARE
Problem: INFECTION - ADULT  Goal: Absence of fever/infection during neutropenic period  INTERVENTIONS:  - Monitor WBC  - Implement neutropenic guidelines   Outcome: Completed Date Met: 02/05/18

## 2018-02-05 NOTE — PLAN OF CARE
Maintains optimal cardiac output and hemodynamic stability Progressing      Discharge to home or other facility with appropriate resources Progressing      Absence or prevention of progression during hospitalization Progressing      Patient/family/caregiver demonstrates understanding of disease process, treatment plan, medications, and discharge instructions Progressing      Fluid balance maintained Progressing      Glucose maintained within target range Progressing      Verbalizes/displays adequate comfort level or baseline comfort level Progressing      Patient will remain free of falls Progressing      Achieves optimal ventilation and oxygenation Progressing      Maintain or return to baseline ADL function Progressing

## 2018-02-06 LAB
BACTERIA SPT RESP CULT: NORMAL
GRAM STN SPEC: NORMAL

## 2018-02-07 NOTE — CASE MANAGEMENT
Notification of Discharge  This is a Notification of Discharge from our facility [address]  Please be advised that this patient has been discharge from our facility  Below you will find the admission and discharge date and time including the patients disposition  PRESENTATION DATE: 2/3/2018  6:22 PM  IP ADMISSION DATE: 2/3/18 2252  DISCHARGE DATE: 2/5/2018  2:48 PM  DISPOSITION: [address] [place] [address] in the [facility] [address] for 2017  Network Utilization Review Department  Phone: [phone]; Fax [phone]  ATTENTION: The Network Utilization Review Department is now centralized for our 7 Facilities  Make a note that we have a new phone and fax numbers for our Department  Please call with any questions or concerns to [phone] and carefully follow the prompts so that you are directed to the right person  All voicemails are confidential  Fax any determinations, approvals, denials, and requests for initial or continue stay review clinical to [phone]  Due to HIGH CALL volume, it would be easier if you could please send faxed requests to expedite your requests and in part, help us provide discharge notifications faster

## 2018-02-10 LAB
ATRIAL RATE: 87 BPM
P AXIS: 62 DEGREES
PR INTERVAL: 156 MS
QRS AXIS: 61 DEGREES
QRSD INTERVAL: 88 MS
QT INTERVAL: 380 MS
QTC INTERVAL: 457 MS
T WAVE AXIS: 43 DEGREES
VENTRICULAR RATE: 87 BPM

## 2018-02-10 PROCEDURE — 93010 ELECTROCARDIOGRAM REPORT: CPT | Performed by: INTERNAL MEDICINE

## 2019-06-12 ENCOUNTER — APPOINTMENT (EMERGENCY)
Dept: CT IMAGING | Facility: HOSPITAL | Age: 69
End: 2019-06-12
Payer: COMMERCIAL

## 2019-06-12 ENCOUNTER — HOSPITAL ENCOUNTER (EMERGENCY)
Facility: HOSPITAL | Age: 69
Discharge: HOME | End: 2019-06-12
Attending: EMERGENCY MEDICINE | Admitting: EMERGENCY MEDICINE
Payer: COMMERCIAL

## 2019-06-12 VITALS
HEART RATE: 70 BPM | BODY MASS INDEX: 25.01 KG/M2 | HEIGHT: 68 IN | WEIGHT: 165 LBS | OXYGEN SATURATION: 99 % | DIASTOLIC BLOOD PRESSURE: 79 MMHG | TEMPERATURE: 98.4 F | RESPIRATION RATE: 18 BRPM | SYSTOLIC BLOOD PRESSURE: 192 MMHG

## 2019-06-12 DIAGNOSIS — E04.1 THYROID NODULE: ICD-10-CM
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
## 2019-06-12 DIAGNOSIS — T07.XXXA MULTIPLE ABRASIONS: ICD-10-CM

## 2019-06-12 DIAGNOSIS — S09.90XA CLOSED HEAD INJURY, INITIAL ENCOUNTER: Primary | ICD-10-CM

## 2019-06-12 LAB
ALBUMIN SERPL BCP-MCNC: 3.4 G/DL (ref 3.5–5)
ALP SERPL-CCNC: 92 U/L (ref 46–116)
ALT SERPL W P-5'-P-CCNC: 15 U/L (ref 12–78)
ANION GAP SERPL CALCULATED.3IONS-SCNC: 10 MMOL/L (ref 4–13)
APTT PPP: 30 SECONDS (ref 26–38)
AST SERPL W P-5'-P-CCNC: 16 U/L (ref 5–45)
BASOPHILS # BLD AUTO: 0.03 THOUSANDS/ΜL (ref 0–0.1)
BASOPHILS NFR BLD AUTO: 0 % (ref 0–1)
BILIRUB SERPL-MCNC: 0.3 MG/DL (ref 0.2–1)
BUN SERPL-MCNC: 32 MG/DL (ref 5–25)
CALCIUM SERPL-MCNC: 9.1 MG/DL (ref 8.3–10.1)
CHLORIDE SERPL-SCNC: 102 MMOL/L (ref 100–108)
CO2 SERPL-SCNC: 26 MMOL/L (ref 21–32)
CREAT SERPL-MCNC: 1.58 MG/DL (ref 0.6–1.3)
EOSINOPHIL # BLD AUTO: 0.19 THOUSAND/ΜL (ref 0–0.61)
EOSINOPHIL NFR BLD AUTO: 3 % (ref 0–6)
ERYTHROCYTE [DISTWIDTH] IN BLOOD BY AUTOMATED COUNT: 15.3 % (ref 11.6–15.1)
GFR SERPL CREATININE-BSD FRML MDRD: 44 ML/MIN/1.73SQ M
GLUCOSE SERPL-MCNC: 123 MG/DL (ref 65–140)
HCT VFR BLD AUTO: 32.7 % (ref 36.5–49.3)
HGB BLD-MCNC: 10 G/DL (ref 12–17)
IMM GRANULOCYTES # BLD AUTO: 0.08 THOUSAND/UL (ref 0–0.2)
IMM GRANULOCYTES NFR BLD AUTO: 1 % (ref 0–2)
INR PPP: 1.06 (ref 0.86–1.17)
LYMPHOCYTES # BLD AUTO: 1.91 THOUSANDS/ΜL (ref 0.6–4.47)
LYMPHOCYTES NFR BLD AUTO: 29 % (ref 14–44)
MCH RBC QN AUTO: 25.6 PG (ref 26.8–34.3)
MCHC RBC AUTO-ENTMCNC: 30.6 G/DL (ref 31.4–37.4)
MCV RBC AUTO: 84 FL (ref 82–98)
MONOCYTES # BLD AUTO: 0.53 THOUSAND/ΜL (ref 0.17–1.22)
MONOCYTES NFR BLD AUTO: 8 % (ref 4–12)
NEUTROPHILS # BLD AUTO: 3.96 THOUSANDS/ΜL (ref 1.85–7.62)
NEUTS SEG NFR BLD AUTO: 59 % (ref 43–75)
NRBC BLD AUTO-RTO: 0 /100 WBCS
PLATELET # BLD AUTO: 184 THOUSANDS/UL (ref 149–390)
PMV BLD AUTO: 11 FL (ref 8.9–12.7)
POTASSIUM SERPL-SCNC: 4.6 MMOL/L (ref 3.5–5.3)
PROT SERPL-MCNC: 7.3 G/DL (ref 6.4–8.2)
PROTHROMBIN TIME: 13.7 SECONDS (ref 11.8–14.2)
RBC # BLD AUTO: 3.9 MILLION/UL (ref 3.88–5.62)
SODIUM SERPL-SCNC: 138 MMOL/L (ref 136–145)
WBC # BLD AUTO: 6.7 THOUSAND/UL (ref 4.31–10.16)

## 2019-06-12 PROCEDURE — 96374 THER/PROPH/DIAG INJ IV PUSH: CPT

## 2019-06-12 PROCEDURE — 99284 EMERGENCY DEPT VISIT MOD MDM: CPT

## 2019-06-12 PROCEDURE — 74177 CT ABD & PELVIS W/CONTRAST: CPT

## 2019-06-12 PROCEDURE — 71260 CT THORAX DX C+: CPT

## 2019-06-12 PROCEDURE — 70450 CT HEAD/BRAIN W/O DYE: CPT

## 2019-06-12 PROCEDURE — 72125 CT NECK SPINE W/O DYE: CPT

## 2019-06-12 PROCEDURE — 90471 IMMUNIZATION ADMIN: CPT

## 2019-06-12 PROCEDURE — 85610 PROTHROMBIN TIME: CPT | Performed by: NURSE PRACTITIONER

## 2019-06-12 PROCEDURE — 90715 TDAP VACCINE 7 YRS/> IM: CPT | Performed by: NURSE PRACTITIONER

## 2019-06-12 PROCEDURE — 85025 COMPLETE CBC W/AUTO DIFF WBC: CPT | Performed by: NURSE PRACTITIONER

## 2019-06-12 PROCEDURE — 99283 EMERGENCY DEPT VISIT LOW MDM: CPT | Performed by: EMERGENCY MEDICINE

## 2019-06-12 PROCEDURE — 85730 THROMBOPLASTIN TIME PARTIAL: CPT | Performed by: NURSE PRACTITIONER

## 2019-06-12 PROCEDURE — 80053 COMPREHEN METABOLIC PANEL: CPT | Performed by: NURSE PRACTITIONER

## 2019-06-12 PROCEDURE — 36415 COLL VENOUS BLD VENIPUNCTURE: CPT | Performed by: NURSE PRACTITIONER

## 2019-06-12 RX ORDER — OXYCODONE HYDROCHLORIDE AND ACETAMINOPHEN 5; 325 MG/1; MG/1
1 TABLET ORAL ONCE
Status: COMPLETED | OUTPATIENT
Start: 2019-06-12 | End: 2019-06-12

## 2019-06-12 RX ORDER — HYDROCODONE BITARTRATE AND ACETAMINOPHEN 5; 325 MG/1; MG/1
1 TABLET ORAL EVERY 6 HOURS PRN
Qty: 12 TABLET | Refills: 0 | Status: SHIPPED | OUTPATIENT
Start: 2019-06-12 | End: 2020-08-31

## 2019-06-12 RX ORDER — GINSENG 100 MG
1 CAPSULE ORAL ONCE
Status: COMPLETED | OUTPATIENT
Start: 2019-06-12 | End: 2019-06-12

## 2019-06-12 RX ADMIN — MORPHINE SULFATE 2 MG: 2 INJECTION, SOLUTION INTRAMUSCULAR; INTRAVENOUS at 20:35

## 2019-06-12 RX ADMIN — IOHEXOL 100 ML: 350 INJECTION, SOLUTION INTRAVENOUS at 20:07

## 2019-06-12 RX ADMIN — OXYCODONE HYDROCHLORIDE AND ACETAMINOPHEN 1 TABLET: 5; 325 TABLET ORAL at 21:35

## 2019-06-12 RX ADMIN — TETANUS TOXOID, REDUCED DIPHTHERIA TOXOID AND ACELLULAR PERTUSSIS VACCINE, ADSORBED 0.5 ML: 5; 2.5; 8; 8; 2.5 SUSPENSION INTRAMUSCULAR at 20:40

## 2019-06-12 RX ADMIN — BACITRACIN ZINC 1 LARGE APPLICATION: 500 OINTMENT TOPICAL at 21:49

## 2019-08-27 ENCOUNTER — TELEPHONE (OUTPATIENT)
Dept: OTHER | Facility: HOSPITAL | Age: 69
End: 2019-08-27

## 2019-08-27 ENCOUNTER — HOSPITAL ENCOUNTER (EMERGENCY)
Facility: HOSPITAL | Age: 69
Discharge: HOME | End: 2019-08-27
Attending: EMERGENCY MEDICINE | Admitting: EMERGENCY MEDICINE
Payer: COMMERCIAL

## 2019-08-27 ENCOUNTER — APPOINTMENT (EMERGENCY)
Dept: CT IMAGING | Facility: HOSPITAL | Age: 69
End: 2019-08-27
Payer: COMMERCIAL

## 2019-08-27 VITALS
OXYGEN SATURATION: 98 % | SYSTOLIC BLOOD PRESSURE: 135 MMHG | DIASTOLIC BLOOD PRESSURE: 62 MMHG | RESPIRATION RATE: 18 BRPM | HEART RATE: 74 BPM

## 2019-08-27 DIAGNOSIS — N28.1 RENAL CYST: ICD-10-CM

## 2019-08-27 DIAGNOSIS — R31.9 HEMATURIA, UNSPECIFIED TYPE: Primary | ICD-10-CM

## 2019-08-27 LAB
ANION GAP SERPL CALCULATED.3IONS-SCNC: 9 MMOL/L (ref 4–13)
BACTERIA UR QL AUTO: ABNORMAL /HPF
BASOPHILS # BLD AUTO: 0.04 THOUSANDS/ΜL (ref 0–0.1)
BASOPHILS NFR BLD AUTO: 1 % (ref 0–1)
BILIRUB UR QL STRIP: NEGATIVE
BUN SERPL-MCNC: 24 MG/DL (ref 5–25)
CALCIUM SERPL-MCNC: 9 MG/DL (ref 8.3–10.1)
CHLORIDE SERPL-SCNC: 103 MMOL/L (ref 100–108)
CLARITY UR: ABNORMAL
CO2 SERPL-SCNC: 27 MMOL/L (ref 21–32)
COLOR UR: YELLOW
CREAT SERPL-MCNC: 1.51 MG/DL (ref 0.6–1.3)
EOSINOPHIL # BLD AUTO: 0.26 THOUSAND/ΜL (ref 0–0.61)
EOSINOPHIL NFR BLD AUTO: 4 % (ref 0–6)
ERYTHROCYTE [DISTWIDTH] IN BLOOD BY AUTOMATED COUNT: 15 % (ref 11.6–15.1)
GFR SERPL CREATININE-BSD FRML MDRD: 46 ML/MIN/1.73SQ M
GLUCOSE SERPL-MCNC: 217 MG/DL (ref 65–140)
GLUCOSE UR STRIP-MCNC: ABNORMAL MG/DL
HCT VFR BLD AUTO: 31 % (ref 36.5–49.3)
HGB BLD-MCNC: 9.5 G/DL (ref 12–17)
HGB UR QL STRIP.AUTO: ABNORMAL
HYALINE CASTS #/AREA URNS LPF: ABNORMAL /LPF
IMM GRANULOCYTES # BLD AUTO: 0.02 THOUSAND/UL (ref 0–0.2)
IMM GRANULOCYTES NFR BLD AUTO: 0 % (ref 0–2)
KETONES UR STRIP-MCNC: NEGATIVE MG/DL
LEUKOCYTE ESTERASE UR QL STRIP: ABNORMAL
LYMPHOCYTES # BLD AUTO: 2.08 THOUSANDS/ΜL (ref 0.6–4.47)
LYMPHOCYTES NFR BLD AUTO: 32 % (ref 14–44)
MCH RBC QN AUTO: 26 PG (ref 26.8–34.3)
MCHC RBC AUTO-ENTMCNC: 30.6 G/DL (ref 31.4–37.4)
MCV RBC AUTO: 85 FL (ref 82–98)
MONOCYTES # BLD AUTO: 0.82 THOUSAND/ΜL (ref 0.17–1.22)
MONOCYTES NFR BLD AUTO: 13 % (ref 4–12)
MUCOUS THREADS UR QL AUTO: ABNORMAL
NEUTROPHILS # BLD AUTO: 3.31 THOUSANDS/ΜL (ref 1.85–7.62)
NEUTS SEG NFR BLD AUTO: 50 % (ref 43–75)
NITRITE UR QL STRIP: NEGATIVE
NON-SQ EPI CELLS URNS QL MICRO: ABNORMAL /HPF
NRBC BLD AUTO-RTO: 0 /100 WBCS
PH UR STRIP.AUTO: 5.5 [PH]
PLATELET # BLD AUTO: 175 THOUSANDS/UL (ref 149–390)
PMV BLD AUTO: 10.8 FL (ref 8.9–12.7)
POTASSIUM SERPL-SCNC: 4.3 MMOL/L (ref 3.5–5.3)
PROT UR STRIP-MCNC: ABNORMAL MG/DL
RBC # BLD AUTO: 3.65 MILLION/UL (ref 3.88–5.62)
RBC #/AREA URNS AUTO: ABNORMAL /HPF
SODIUM SERPL-SCNC: 139 MMOL/L (ref 136–145)
SP GR UR STRIP.AUTO: >=1.03 (ref 1–1.03)
UROBILINOGEN UR QL STRIP.AUTO: 1 E.U./DL
WBC # BLD AUTO: 6.53 THOUSAND/UL (ref 4.31–10.16)
WBC #/AREA URNS AUTO: ABNORMAL /HPF

## 2019-08-27 PROCEDURE — 81001 URINALYSIS AUTO W/SCOPE: CPT | Performed by: EMERGENCY MEDICINE

## 2019-08-27 PROCEDURE — 87077 CULTURE AEROBIC IDENTIFY: CPT | Performed by: EMERGENCY MEDICINE

## 2019-08-27 PROCEDURE — 80048 BASIC METABOLIC PNL TOTAL CA: CPT | Performed by: EMERGENCY MEDICINE

## 2019-08-27 PROCEDURE — 87086 URINE CULTURE/COLONY COUNT: CPT | Performed by: EMERGENCY MEDICINE

## 2019-08-27 PROCEDURE — 99284 EMERGENCY DEPT VISIT MOD MDM: CPT

## 2019-08-27 PROCEDURE — 99283 EMERGENCY DEPT VISIT LOW MDM: CPT | Performed by: EMERGENCY MEDICINE

## 2019-08-27 PROCEDURE — 87186 SC STD MICRODIL/AGAR DIL: CPT | Performed by: EMERGENCY MEDICINE

## 2019-08-27 PROCEDURE — 36415 COLL VENOUS BLD VENIPUNCTURE: CPT | Performed by: EMERGENCY MEDICINE

## 2019-08-27 PROCEDURE — 85025 COMPLETE CBC W/AUTO DIFF WBC: CPT | Performed by: EMERGENCY MEDICINE

## 2019-08-27 PROCEDURE — 74176 CT ABD & PELVIS W/O CONTRAST: CPT

## 2019-08-27 NOTE — ED PROVIDER NOTES
History  Chief Complaint   Patient presents with    Possible UTI     pt states for 2 days he has had blood in his urine and it hurts when he urinates       History provided by:  Patient and spouse  Blood in Urine   This is a new problem  The current episode started in the past 7 days  The problem has been gradually worsening since onset  He describes the hematuria as gross hematuria  The hematuria occurs throughout his entire urinary stream  He reports no clotting in his urine stream  His pain is at a severity of 2/10  The pain is mild  He describes his urine color as light pink  Irritative symptoms include frequency and urgency  Irritative symptoms do not include nocturia  Obstructive symptoms include a slower stream  Obstructive symptoms do not include dribbling or incomplete emptying  Associated symptoms include dysuria and hesitancy  Pertinent negatives include no abdominal pain, chills, fever, flank pain, genital pain, inability to urinate, nausea or vomiting  His past medical history is significant for hypertension and kidney stones  Prior to Admission Medications   Prescriptions Last Dose Informant Patient Reported? Taking?    HYDROcodone-acetaminophen (NORCO) 5-325 mg per tablet   No No   Sig: Take 1 tablet by mouth every 6 (six) hours as needed (Not relieved by Anti-inflammatory) for up to 12 dosesMax Daily Amount: 4 tablets   aspirin 81 mg chewable tablet   Yes No   Sig: Chew 81 mg daily   atorvastatin (LIPITOR) 20 mg tablet   Yes No   Sig: Take 20 mg by mouth daily   cyanocobalamin (VITAMIN B-12) 1,000 mcg tablet   Yes No   Sig: Take by mouth daily   enalapril (VASOTEC) 10 mg tablet   Yes No   Sig: Take 10 mg by mouth daily   famotidine (PEPCID) 20 mg tablet   Yes No   Sig: Take 20 mg by mouth daily   folic acid (FOLVITE) 1 mg tablet   Yes No   Sig: Take by mouth daily   gabapentin (NEURONTIN) 300 mg capsule   Yes No   Sig: Take 100 mg by mouth 2 (two) times a day     glipiZIDE (GLUCOTROL) 5 mg tablet   Yes No   Sig: Take 5 mg by mouth 2 (two) times a day before meals   insulin glargine (LANTUS) 100 units/mL subcutaneous injection   Yes No   Sig: Inject 30 Units under the skin daily   isosorbide dinitrate (ISORDIL) 30 mg tablet   Yes No   Sig: Take 30 mg by mouth daily   metFORMIN (GLUCOPHAGE) 1000 MG tablet   Yes No   Sig: Take 1,000 mg by mouth 2 (two) times a day with meals   metoprolol succinate (TOPROL-XL) 25 mg 24 hr tablet   Yes No   Sig: Take 25 mg by mouth 2 (two) times a day   nitroglycerin (NITRODUR) 0 2 mg/hr   Yes No   Sig: Place 1 patch on the skin as needed     prasugrel (EFFIENT) tablet   Yes No   Sig: Take 10 mg by mouth   sitaGLIPtin (JANUVIA) 100 mg tablet   Yes No   Sig: Take 100 mg by mouth daily      Facility-Administered Medications: None       Past Medical History:   Diagnosis Date    [facility]     Hyperlipidemia     Hypertension        Past Surgical History:   Procedure Laterality Date    CORONARY ARTERY BYPASS GRAFT         History reviewed  No pertinent family history  I have reviewed and agree with the history as documented  Social History     Tobacco Use    Smoking status: Current Every Day Smoker     Packs/day: 0 25     Types: Cigarettes   Substance Use Topics    Alcohol use: No    Drug use: No        Review of Systems   Constitutional: Negative for chills and fever  Gastrointestinal: Negative for abdominal pain, nausea and vomiting  Genitourinary: Positive for dysuria, frequency, hematuria, hesitancy and urgency  Negative for flank pain, incomplete emptying and nocturia  All other systems reviewed and are negative  Physical Exam  Physical Exam   Constitutional: He is oriented to person, place, and time  He appears well-developed and well-nourished  No distress  HENT:   Head: Normocephalic and atraumatic     Nose: Nose normal    Mouth/Throat: Oropharynx is clear and moist    Eyes: Conjunctivae and EOM are normal    Neck: Normal range of motion  Neck supple  Cardiovascular: Normal rate, regular rhythm and normal heart sounds  Pulmonary/Chest: Effort normal and breath sounds normal  No respiratory distress  Abdominal: Soft  He exhibits no distension  There is no tenderness  Musculoskeletal: Normal range of motion  Neurological: He is alert and oriented to person, place, and time  Skin: Skin is warm and dry  He is not diaphoretic  Psychiatric: He has a normal mood and affect  Nursing note and vitals reviewed  Vital Signs  ED Triage Vitals [08/27/19 1720]   Temp Pulse Respirations Blood Pressure SpO2   -- 74 18 135/62 98 %      Temp src Heart Rate Source Patient Position - Orthostatic VS BP Location FiO2 (%)   -- Monitor Sitting Right arm --      Pain Score       --           Vitals:    08/27/19 1720   BP: 135/62   Pulse: 74   Patient Position - Orthostatic VS: Sitting         Visual Acuity      ED Medications  Medications - No data to display    Diagnostic Studies  Results Reviewed     Procedure Component Value Units Date/Time    Urine Microscopic [[id number]]  (Abnormal) Collected:  08/27/19 1751    Lab Status:  Final result Specimen:  Urine, Clean Catch Updated:  08/27/19 1808     RBC, UA 30-50 /hpf      WBC, UA 10-20 /hpf      Epithelial Cells Occasional /hpf      Bacteria, UA Occasional /hpf      Hyaline Casts, UA 0-1 /lpf      MUCUS THREADS Occasional    Urine culture [[id number]] Collected:  08/27/19 1751    Lab Status:   In process Specimen:  Urine, Clean Catch Updated:  08/27/19 1819    Basic metabolic panel [[id number]]  (Abnormal) Collected:  08/27/19 1749    Lab Status:  Final result Specimen:  Blood from Arm, Right Updated:  08/27/19 1805     Sodium 139 mmol/L      Potassium 4 3 mmol/L      Chloride 103 mmol/L      CO2 27 mmol/L      ANION GAP 9 mmol/L      BUN 24 mg/dL      Creatinine 1 51 mg/dL      Glucose 217 mg/dL      Calcium 9 0 mg/dL      eGFR 46 ml/min/1 73sq m     Narrative:       National Kidney Disease Foundation guidelines for Chronic Kidney Disease (CKD):     Stage 1 with normal or high GFR (GFR > 90 mL/min/1 73 square meters)    Stage 2 Mild CKD (GFR = 60-89 mL/min/1 73 square meters)    Stage 3A Moderate CKD (GFR = 45-59 mL/min/1 73 square meters)    Stage 3B Moderate CKD (GFR = 30-44 mL/min/1 73 square meters)    Stage 4 Severe CKD (GFR = 15-29 mL/min/1 73 square meters)    Stage 5 End Stage CKD (GFR <15 mL/min/1 73 square meters)  Note: GFR calculation is accurate only with a steady state creatinine    UA w Reflex to Microscopic w Reflex to Culture [[id number]]  (Abnormal) Collected:  08/27/19 1751    Lab Status:  Final result Specimen:  Urine, Clean Catch Updated:  08/27/19 1758     Color, UA Yellow     Clarity, UA Slightly Cloudy     Specific Gravity, UA >=1 030     pH, UA 5 5     Leukocytes, UA Small     Nitrite, UA Negative     Protein, UA 30 (1+) mg/dl      Glucose, UA >=1000 (1%) mg/dl      Ketones, UA Negative mg/dl      Urobilinogen, UA 1 0 E U /dl      Bilirubin, UA Negative     Blood, UA Large    CBC and differential [[id number]]  (Abnormal) Collected:  08/27/19 1749    Lab Status:  Final result Specimen:  Blood from Arm, Right Updated:  08/27/19 1755     WBC 6 53 Thousand/uL      RBC 3 65 Million/uL      Hemoglobin 9 5 g/dL      Hematocrit 31 0 %      MCV 85 fL      MCH 26 0 pg      MCHC 30 6 g/dL      RDW 15 0 %      MPV 10 8 fL      Platelets 470 Thousands/uL      nRBC 0 /100 WBCs      Neutrophils Relative 50 %      Immat GRANS % 0 %      Lymphocytes Relative 32 %      Monocytes Relative 13 %      Eosinophils Relative 4 %      Basophils Relative 1 %      Neutrophils Absolute 3 31 Thousands/µL      Immature Grans Absolute 0 02 Thousand/uL      Lymphocytes Absolute 2 08 Thousands/µL      Monocytes Absolute 0 82 Thousand/µL      Eosinophils Absolute 0 26 Thousand/µL      Basophils Absolute 0 04 Thousands/µL                  CT abdomen pelvis wo contrast   Final Result by Fercho Vaz MD (08/27 1906)      Simple and possibly complex renal cystic structures  Correlate with nonemergent outpatient renal/bladder ultrasound recommended  Possible small bowel enteritis  Trace nonspecific right pelvic free fluid is abnormal for a male patient could relate to infectious/inflammatory etiology such as small bowel enteritis  Nondistended inadequately evaluated bladder  Workstation performed: [id number]                    Procedures  Procedures       ED Course  ED Course as of Aug 27 2003   Tue Aug 27, 2019   2001 Discussed with patient and his family including his wife and daughter at length about the labs and the CT scan results  Patient does have hematuria without signs of infection  Patient has a CT scan that shows left renal cyst that now appears complex and larger than a prior CT scan and that this may be concerning for cancer  Patient is a smoker so does have risk factors for renal cancer  Patient does have a family doctor  His creatinine and hemoglobin are at baseline  He is still urinating and does not have signs of obstruction  Discussed with them that they need an outpatient ultrasound to further evaluate this and needs to see Urology  They were given a copy of their CT scan result showing these results with the finding of the cyst highlighted and gave them discharge paperwork with the numbers for Urology and the recommendations that they need to be seen the next week or 2 highlighted  Page was made to Urology to discussed with them this patient for follow-up and awaiting a call back                                    MDM  Number of Diagnoses or Management Options  Hematuria, unspecified type: new and requires workup  Renal cyst: new and requires workup     Amount and/or Complexity of Data Reviewed  Clinical lab tests: ordered and reviewed  Tests in the radiology section of CPT®: ordered and reviewed    Risk of Complications, Morbidity, and/or Mortality  Presenting problems: high        Disposition  Final diagnoses:   Hematuria, unspecified type   Renal cyst     Time reflects when diagnosis was documented in both MDM as applicable and the Disposition within this note     Time User Action Codes Description Comment    8/27/2019  7:38 PM Raffaele Solomon, [address] [R31 9] Hematuria, unspecified type     8/27/2019  7:38 PM Lennox Roa M Add [N28 1] Renal cyst       ED Disposition     ED Disposition Condition Date/Time Comment    Discharge Stable Tue Aug 27, 2019  7:38 PM Augusto Edmonds discharge to home/self care  Follow-up Information     Follow up With Specialties Details Why Contact Info Additional [address] [facility] Emergency Department Emergency Medicine Go to  If symptoms worsen [address] [facility] [address] [phone] [place] ED, [address] [place], [place], [address] [facility],  [address]  Call  If symptoms worsen [address] [facility] [id number] [place] [address] For Urology [place] [facility] Call  Tomorrow to arrange follow-up in the next 1-2 weeks for renal ultrasound and hematuria follow-up  [address] [facility] [address]  704  [facility] [address]  [address], [place], [place], [address]          Patient's Medications   Discharge Prescriptions    No medications on file     No discharge procedures on file      ED Provider  Electronically Signed by           Cleo Hammonds MD  08/27/19 2003

## 2019-08-27 NOTE — DISCHARGE INSTRUCTIONS
You were given a copy of your CT scan that shows a complex renal cyst on the left that needs follow-up ultrasound  Please make sure you follow-up with your family doctor and Urology like we discussed about these results to make sure nothing more significant like cancer has developed

## 2019-08-28 NOTE — TELEPHONE ENCOUNTER
Called and spoke to patient  Patient confirmed appointment 9/3/19 at 11:15 with Chidi Horne PA-C for new patient appointment for hematuria  Also recommended to patient that her PCP should order her an US kidney and bladder and have this done prior if possible

## 2019-08-28 NOTE — TELEPHONE ENCOUNTER
Please arrange for outpatient visit to establish care and discuss hematuria workup and get US of kidneys and bladder prior, if possible    Visit in the next week would be ideal

## 2019-08-29 ENCOUNTER — TELEPHONE (OUTPATIENT)
Dept: EMERGENCY DEPT | Facility: HOSPITAL | Age: 69
End: 2019-08-29

## 2019-08-29 DIAGNOSIS — N39.0 URINARY TRACT INFECTION: Primary | ICD-10-CM

## 2019-08-29 LAB — BACTERIA UR CULT: ABNORMAL

## 2019-08-29 RX ORDER — SULFAMETHOXAZOLE AND TRIMETHOPRIM 800; 160 MG/1; MG/1
1 TABLET ORAL 2 TIMES DAILY
Qty: 10 TABLET | Refills: 0 | Status: SHIPPED | OUTPATIENT
Start: 2019-08-29 | End: 2019-09-03

## 2019-08-29 NOTE — TELEPHONE ENCOUNTER
Patient notified of positive urine test Bactrim called to the patient's pharmacy which is [facility]

## 2019-08-30 ENCOUNTER — TELEPHONE (OUTPATIENT)
Dept: UROLOGY | Facility: CLINIC | Age: 69
End: 2019-08-30

## 2019-08-30 NOTE — TELEPHONE ENCOUNTER
I called pt to verify insurance  Has [employer]  We do not participate  Pt aware that his appt for 9/3/19 with Urology is being cancelled      Referred [facility] to [facility] to find a [facility] provider

## 2019-08-30 NOTE — TELEPHONE ENCOUNTER
Patient is on my schedule to see Tuesday  I had requested ultrasound to be performed prior to appointment  This does not look like it has been scheduled  I have placed an order for this    I am not sure if this could be done prior to appointment but that would be ideal

## 2019-08-30 NOTE — TELEPHONE ENCOUNTER
Attempted to call patient twice  Patient did not answer could not leave message, voicemail not set up

## 2020-02-21 NOTE — DISCHARGE SUMMARY
Discharge Summary - [facility] Internal Medicine    Patient Information: Yunior Liriano 70 y o  male MRN: [id number]  Unit/Bed#: -01 Encounter: [id number]    Discharging Physician / Practitioner: Efrain Oleary MD  PCP: Ford Davenport  Admission Date: 2/3/2018  Discharge Date: 02/05/18    Disposition:     Home    Reason for Admission: SOB    Discharge Diagnoses:     Principal Problem:    Influenza A  Active Problems:    CAP (community acquired pneumonia)    Chest pain    Hypertension    Diabetes ([address] )  Resolved Problems:    * No resolved hospital problems  *      Consultations During Hospital Stay:  · none    Procedures Performed:     · None  ·     Significant Findings / Test Results:     ·     Incidental Findings:   ·      Test Results Pending at Discharge (will require follow up):   ·      Outpatient Tests Requested:  ·     Complications:  None    History of Present Illness:     Yunior Liriano is a 70 y o  male who presents with complaints of cough and shortness of breath has been present for last day  Patient denies any fevers or chills  No chest pain, palpitations or diaphoresis  In ER patient had a CXR unofficial reading of RML pneumonia  Hospital Course:     Yunior Liriano is a 70 y o  male patient who originally presented to the hospital on 2/3/2018 due to complaints of cough and SOB  Patient was originally treated with IV antibiotics for possible pneumonia and flu  Patient came back positive for influenza A  He was given tamiflu and IV fluids  All patients symptoms are mostlikely attributed to the flu and less likely pneumonia  He had no leukocytosis and his official read on CXR was negative for any pneumonia  Patient is currently hemodynamically stable and will be discharged home  Patient followup with his PCP as outpatient  Condition at Discharge: stable     Discharge Day Visit / Exam:     Subjective:  Patient seen and examined at bedside  Patient has no new complaints    Vitals: Blood Pressure: (!) 190/73 (02/05/18 0700)  Pulse: 84 (02/05/18 0700)  Temperature: 99 2 °F (37 3 °C) (02/05/18 0700)  Temp Source: Oral (02/05/18 0700)  Respirations: 18 (02/05/18 0729)  Height: 5' 8" (172 7 cm) (02/03/18 2341)  Weight - Scale: 76 5 kg (168 lb 10 4 oz) (02/03/18 2226)  SpO2: 94 % (02/05/18 0729)  Exam:   Physical Exam   Constitutional: He is oriented to person, place, and time  He appears well-developed and well-nourished  HENT:   Head: Normocephalic and atraumatic  Eyes: Conjunctivae and EOM are normal  Pupils are equal, round, and reactive to light  Neck: Normal range of motion  Neck supple  No JVD present  No tracheal deviation present  No thyromegaly present  Cardiovascular: Normal rate, regular rhythm and normal heart sounds  Exam reveals no gallop and no friction rub  No murmur heard  Pulmonary/Chest: Effort normal and breath sounds normal  No respiratory distress  He has no wheezes  He has no rales  Abdominal: Soft  Bowel sounds are normal  He exhibits no distension  There is no tenderness  There is no rebound  Musculoskeletal: Normal range of motion  He exhibits no edema  Neurological: He is alert and oriented to person, place, and time  Vitals reviewed  Discussion with Family: daughter    Discharge instructions/Information to patient and family:   See after visit summary for information provided to patient and family  Provisions for Follow-Up Care:  See after visit summary for information related to follow-up care and any pertinent home health orders  Planned Readmission: none     Discharge Statement:  I spent 50 minutes discharging the patient  This time was spent on the day of discharge  I had direct contact with the patient on the day of discharge  Greater than 50% of the total time was spent examining patient, answering all patient questions, arranging and discussing plan of care with patient as well as directly providing post-discharge instructions  Additional time then spent on discharge activities  Discharge Medications:  See after visit summary for reconciled discharge medications provided to patient and family        ** Please Note: This note has been constructed using a voice recognition system ** ,DirectAddress_Unknown,DirectAddress_Unknown ,DirectAddress_Unknown,DirectAddress_Unknown,[email or web address]

## 2020-08-31 ENCOUNTER — HOSPITAL ENCOUNTER (OUTPATIENT)
Facility: HOSPITAL | Age: 70
Setting detail: OBSERVATION
Discharge: HOME | End: 2020-09-01
Attending: EMERGENCY MEDICINE | Admitting: STUDENT IN AN ORGANIZED HEALTH CARE EDUCATION/TRAINING PROGRAM
Payer: COMMERCIAL

## 2020-08-31 ENCOUNTER — APPOINTMENT (OUTPATIENT)
Dept: RADIOLOGY | Facility: HOSPITAL | Age: 70
End: 2020-08-31
Payer: COMMERCIAL

## 2020-08-31 DIAGNOSIS — E11.9 DIABETES (HCC): Chronic | ICD-10-CM

## 2020-08-31 DIAGNOSIS — J18.9 CAP (COMMUNITY ACQUIRED PNEUMONIA): ICD-10-CM

## 2020-08-31 DIAGNOSIS — R07.89 CHEST PRESSURE: ICD-10-CM

## 2020-08-31 DIAGNOSIS — E16.2 HYPOGLYCEMIA: Primary | ICD-10-CM

## 2020-08-31 PROBLEM — D64.9 NORMOCYTIC ANEMIA: Status: ACTIVE | Noted: 2020-08-31

## 2020-08-31 PROBLEM — N30.00 ACUTE CYSTITIS WITHOUT HEMATURIA: Status: ACTIVE | Noted: 2020-08-31

## 2020-08-31 PROBLEM — I10 ESSENTIAL HYPERTENSION: Status: ACTIVE | Noted: 2018-02-03

## 2020-08-31 PROBLEM — N18.30 STAGE 3 CHRONIC KIDNEY DISEASE (HCC): Status: ACTIVE | Noted: 2020-08-31

## 2020-08-31 LAB
ALBUMIN SERPL BCP-MCNC: 2.9 G/DL (ref 3.5–5)
ALP SERPL-CCNC: 71 U/L (ref 46–116)
ALT SERPL W P-5'-P-CCNC: 13 U/L (ref 12–78)
ANION GAP SERPL CALCULATED.3IONS-SCNC: 7 MMOL/L (ref 4–13)
AST SERPL W P-5'-P-CCNC: 15 U/L (ref 5–45)
ATRIAL RATE: 70 BPM
ATRIAL RATE: 71 BPM
ATRIAL RATE: 86 BPM
BASOPHILS # BLD AUTO: 0.03 THOUSANDS/ΜL (ref 0–0.1)
BASOPHILS NFR BLD AUTO: 0 % (ref 0–1)
BILIRUB SERPL-MCNC: 0.3 MG/DL (ref 0.2–1)
BUN SERPL-MCNC: 19 MG/DL (ref 5–25)
CALCIUM SERPL-MCNC: 8.9 MG/DL (ref 8.3–10.1)
CHLORIDE SERPL-SCNC: 99 MMOL/L (ref 100–108)
CO2 SERPL-SCNC: 27 MMOL/L (ref 21–32)
CREAT SERPL-MCNC: 1.45 MG/DL (ref 0.6–1.3)
EOSINOPHIL # BLD AUTO: 0.09 THOUSAND/ΜL (ref 0–0.61)
EOSINOPHIL NFR BLD AUTO: 1 % (ref 0–6)
ERYTHROCYTE [DISTWIDTH] IN BLOOD BY AUTOMATED COUNT: 14.4 % (ref 11.6–15.1)
EST. AVERAGE GLUCOSE BLD GHB EST-MCNC: 134 MG/DL
GFR SERPL CREATININE-BSD FRML MDRD: 48 ML/MIN/1.73SQ M
GLUCOSE SERPL-MCNC: 101 MG/DL (ref 65–140)
GLUCOSE SERPL-MCNC: 111 MG/DL (ref 65–140)
GLUCOSE SERPL-MCNC: 136 MG/DL (ref 65–140)
GLUCOSE SERPL-MCNC: 163 MG/DL (ref 65–140)
GLUCOSE SERPL-MCNC: 165 MG/DL (ref 65–140)
GLUCOSE SERPL-MCNC: 177 MG/DL (ref 65–140)
GLUCOSE SERPL-MCNC: 223 MG/DL (ref 65–140)
GLUCOSE SERPL-MCNC: 245 MG/DL (ref 65–140)
GLUCOSE SERPL-MCNC: 53 MG/DL (ref 65–140)
HBA1C MFR BLD: 6.3 %
HCT VFR BLD AUTO: 32.4 % (ref 36.5–49.3)
HGB BLD-MCNC: 9.9 G/DL (ref 12–17)
IMM GRANULOCYTES # BLD AUTO: 0.06 THOUSAND/UL (ref 0–0.2)
IMM GRANULOCYTES NFR BLD AUTO: 1 % (ref 0–2)
LYMPHOCYTES # BLD AUTO: 1.36 THOUSANDS/ΜL (ref 0.6–4.47)
LYMPHOCYTES NFR BLD AUTO: 14 % (ref 14–44)
MCH RBC QN AUTO: 26.6 PG (ref 26.8–34.3)
MCHC RBC AUTO-ENTMCNC: 30.6 G/DL (ref 31.4–37.4)
MCV RBC AUTO: 87 FL (ref 82–98)
MONOCYTES # BLD AUTO: 0.6 THOUSAND/ΜL (ref 0.17–1.22)
MONOCYTES NFR BLD AUTO: 6 % (ref 4–12)
NEUTROPHILS # BLD AUTO: 7.78 THOUSANDS/ΜL (ref 1.85–7.62)
NEUTS SEG NFR BLD AUTO: 78 % (ref 43–75)
NRBC BLD AUTO-RTO: 0 /100 WBCS
P AXIS: 55 DEGREES
P AXIS: 63 DEGREES
P AXIS: 71 DEGREES
PLATELET # BLD AUTO: 173 THOUSANDS/UL (ref 149–390)
PLATELET # BLD AUTO: 195 THOUSANDS/UL (ref 149–390)
PMV BLD AUTO: 10.7 FL (ref 8.9–12.7)
PMV BLD AUTO: 11.4 FL (ref 8.9–12.7)
POTASSIUM SERPL-SCNC: 5 MMOL/L (ref 3.5–5.3)
PR INTERVAL: 174 MS
PR INTERVAL: 178 MS
PR INTERVAL: 180 MS
PROT SERPL-MCNC: 7.2 G/DL (ref 6.4–8.2)
QRS AXIS: 52 DEGREES
QRS AXIS: 53 DEGREES
QRS AXIS: 94 DEGREES
QRSD INTERVAL: 104 MS
QRSD INTERVAL: 92 MS
QRSD INTERVAL: 94 MS
QT INTERVAL: 372 MS
QT INTERVAL: 394 MS
QT INTERVAL: 406 MS
QTC INTERVAL: 428 MS
QTC INTERVAL: 438 MS
QTC INTERVAL: 445 MS
RBC # BLD AUTO: 3.72 MILLION/UL (ref 3.88–5.62)
SODIUM SERPL-SCNC: 133 MMOL/L (ref 136–145)
T WAVE AXIS: -7 DEGREES
T WAVE AXIS: 13 DEGREES
T WAVE AXIS: 14 DEGREES
TROPONIN I SERPL-MCNC: 0.02 NG/ML
TROPONIN I SERPL-MCNC: 0.18 NG/ML
TROPONIN I SERPL-MCNC: 0.18 NG/ML
VENTRICULAR RATE: 70 BPM
VENTRICULAR RATE: 71 BPM
VENTRICULAR RATE: 86 BPM
WBC # BLD AUTO: 9.92 THOUSAND/UL (ref 4.31–10.16)

## 2020-08-31 PROCEDURE — 82948 REAGENT STRIP/BLOOD GLUCOSE: CPT

## 2020-08-31 PROCEDURE — 85025 COMPLETE CBC W/AUTO DIFF WBC: CPT | Performed by: EMERGENCY MEDICINE

## 2020-08-31 PROCEDURE — 93005 ELECTROCARDIOGRAM TRACING: CPT

## 2020-08-31 PROCEDURE — 85049 AUTOMATED PLATELET COUNT: CPT | Performed by: PHYSICIAN ASSISTANT

## 2020-08-31 PROCEDURE — 36415 COLL VENOUS BLD VENIPUNCTURE: CPT | Performed by: EMERGENCY MEDICINE

## 2020-08-31 PROCEDURE — 84484 ASSAY OF TROPONIN QUANT: CPT | Performed by: STUDENT IN AN ORGANIZED HEALTH CARE EDUCATION/TRAINING PROGRAM

## 2020-08-31 PROCEDURE — 99220 PR INITIAL OBSERVATION CARE/DAY 70 MINUTES: CPT | Performed by: STUDENT IN AN ORGANIZED HEALTH CARE EDUCATION/TRAINING PROGRAM

## 2020-08-31 PROCEDURE — 99285 EMERGENCY DEPT VISIT HI MDM: CPT

## 2020-08-31 PROCEDURE — 80053 COMPREHEN METABOLIC PANEL: CPT | Performed by: EMERGENCY MEDICINE

## 2020-08-31 PROCEDURE — 84484 ASSAY OF TROPONIN QUANT: CPT | Performed by: EMERGENCY MEDICINE

## 2020-08-31 PROCEDURE — 99204 OFFICE O/P NEW MOD 45 MIN: CPT | Performed by: INTERNAL MEDICINE

## 2020-08-31 PROCEDURE — 83036 HEMOGLOBIN GLYCOSYLATED A1C: CPT | Performed by: PHYSICIAN ASSISTANT

## 2020-08-31 PROCEDURE — 71045 X-RAY EXAM CHEST 1 VIEW: CPT

## 2020-08-31 PROCEDURE — 93010 ELECTROCARDIOGRAM REPORT: CPT | Performed by: INTERNAL MEDICINE

## 2020-08-31 RX ORDER — FOLIC ACID 1 MG/1
1 TABLET ORAL DAILY
Status: DISCONTINUED | OUTPATIENT
Start: 2020-08-31 | End: 2020-09-01 | Stop reason: HOSPADM

## 2020-08-31 RX ORDER — FUROSEMIDE 20 MG/1
10 TABLET ORAL DAILY
Status: DISCONTINUED | OUTPATIENT
Start: 2020-08-31 | End: 2020-09-01 | Stop reason: HOSPADM

## 2020-08-31 RX ORDER — ISOSORBIDE DINITRATE 10 MG/1
30 TABLET ORAL DAILY
Status: DISCONTINUED | OUTPATIENT
Start: 2020-08-31 | End: 2020-09-01

## 2020-08-31 RX ORDER — INSULIN GLARGINE 100 [IU]/ML
15 INJECTION, SOLUTION SUBCUTANEOUS
Status: DISCONTINUED | OUTPATIENT
Start: 2020-08-31 | End: 2020-09-01 | Stop reason: HOSPADM

## 2020-08-31 RX ORDER — LISINOPRIL 20 MG/1
20 TABLET ORAL DAILY
Status: DISCONTINUED | OUTPATIENT
Start: 2020-08-31 | End: 2020-09-01 | Stop reason: HOSPADM

## 2020-08-31 RX ORDER — DEXTROSE MONOHYDRATE 25 G/50ML
25 INJECTION, SOLUTION INTRAVENOUS ONCE
Status: COMPLETED | OUTPATIENT
Start: 2020-08-31 | End: 2020-08-31

## 2020-08-31 RX ORDER — ONDANSETRON 2 MG/ML
4 INJECTION INTRAMUSCULAR; INTRAVENOUS EVERY 6 HOURS PRN
Status: DISCONTINUED | OUTPATIENT
Start: 2020-08-31 | End: 2020-09-01 | Stop reason: HOSPADM

## 2020-08-31 RX ORDER — ASPIRIN 81 MG/1
81 TABLET, CHEWABLE ORAL DAILY
Status: DISCONTINUED | OUTPATIENT
Start: 2020-08-31 | End: 2020-09-01 | Stop reason: HOSPADM

## 2020-08-31 RX ORDER — GABAPENTIN 100 MG/1
100 CAPSULE ORAL 2 TIMES DAILY
Status: DISCONTINUED | OUTPATIENT
Start: 2020-08-31 | End: 2020-09-01 | Stop reason: HOSPADM

## 2020-08-31 RX ORDER — FUROSEMIDE 20 MG/1
10 TABLET ORAL DAILY
COMMUNITY

## 2020-08-31 RX ORDER — ATORVASTATIN CALCIUM 20 MG/1
20 TABLET, FILM COATED ORAL DAILY
Status: DISCONTINUED | OUTPATIENT
Start: 2020-08-31 | End: 2020-09-01 | Stop reason: HOSPADM

## 2020-08-31 RX ORDER — MAGNESIUM HYDROXIDE/ALUMINUM HYDROXICE/SIMETHICONE 120; 1200; 1200 MG/30ML; MG/30ML; MG/30ML
30 SUSPENSION ORAL EVERY 6 HOURS PRN
Status: DISCONTINUED | OUTPATIENT
Start: 2020-08-31 | End: 2020-09-01 | Stop reason: HOSPADM

## 2020-08-31 RX ORDER — DEXTROSE AND SODIUM CHLORIDE 5; .9 G/100ML; G/100ML
75 INJECTION, SOLUTION INTRAVENOUS CONTINUOUS
Status: DISCONTINUED | OUTPATIENT
Start: 2020-08-31 | End: 2020-09-01

## 2020-08-31 RX ORDER — FAMOTIDINE 20 MG/1
20 TABLET, FILM COATED ORAL DAILY
Status: DISCONTINUED | OUTPATIENT
Start: 2020-08-31 | End: 2020-09-01 | Stop reason: HOSPADM

## 2020-08-31 RX ORDER — ACETAMINOPHEN 325 MG/1
650 TABLET ORAL EVERY 4 HOURS PRN
Status: DISCONTINUED | OUTPATIENT
Start: 2020-08-31 | End: 2020-09-01 | Stop reason: HOSPADM

## 2020-08-31 RX ORDER — METOPROLOL SUCCINATE 25 MG/1
25 TABLET, EXTENDED RELEASE ORAL DAILY
Status: DISCONTINUED | OUTPATIENT
Start: 2020-08-31 | End: 2020-09-01 | Stop reason: HOSPADM

## 2020-08-31 RX ORDER — PRASUGREL 10 MG/1
10 TABLET, FILM COATED ORAL DAILY
Status: DISCONTINUED | OUTPATIENT
Start: 2020-08-31 | End: 2020-09-01 | Stop reason: HOSPADM

## 2020-08-31 RX ORDER — HEPARIN SODIUM 5000 [USP'U]/ML
5000 INJECTION, SOLUTION INTRAVENOUS; SUBCUTANEOUS EVERY 8 HOURS SCHEDULED
Status: DISCONTINUED | OUTPATIENT
Start: 2020-08-31 | End: 2020-09-01 | Stop reason: HOSPADM

## 2020-08-31 RX ADMIN — LISINOPRIL 20 MG: 20 TABLET ORAL at 10:58

## 2020-08-31 RX ADMIN — INSULIN LISPRO 2 UNITS: 100 INJECTION, SOLUTION INTRAVENOUS; SUBCUTANEOUS at 21:17

## 2020-08-31 RX ADMIN — INSULIN GLARGINE 15 UNITS: 100 INJECTION, SOLUTION SUBCUTANEOUS at 21:16

## 2020-08-31 RX ADMIN — FUROSEMIDE 10 MG: 20 TABLET ORAL at 10:58

## 2020-08-31 RX ADMIN — ASPIRIN 81 MG: 81 TABLET, CHEWABLE ORAL at 10:55

## 2020-08-31 RX ADMIN — ATORVASTATIN CALCIUM 20 MG: 20 TABLET, FILM COATED ORAL at 10:54

## 2020-08-31 RX ADMIN — GABAPENTIN 100 MG: 100 CAPSULE ORAL at 17:02

## 2020-08-31 RX ADMIN — HEPARIN SODIUM 5000 UNITS: 5000 INJECTION INTRAVENOUS; SUBCUTANEOUS at 10:59

## 2020-08-31 RX ADMIN — HEPARIN SODIUM 5000 UNITS: 5000 INJECTION INTRAVENOUS; SUBCUTANEOUS at 21:16

## 2020-08-31 RX ADMIN — ISOSORBIDE DINITRATE 30 MG: 10 TABLET ORAL at 10:54

## 2020-08-31 RX ADMIN — GABAPENTIN 100 MG: 100 CAPSULE ORAL at 10:54

## 2020-08-31 RX ADMIN — INSULIN LISPRO 2 UNITS: 100 INJECTION, SOLUTION INTRAVENOUS; SUBCUTANEOUS at 17:03

## 2020-08-31 RX ADMIN — FAMOTIDINE 20 MG: 20 TABLET, FILM COATED ORAL at 10:59

## 2020-08-31 RX ADMIN — DEXTROSE AND SODIUM CHLORIDE 75 ML/HR: 5; 900 INJECTION, SOLUTION INTRAVENOUS at 06:44

## 2020-08-31 RX ADMIN — DEXTROSE AND SODIUM CHLORIDE 75 ML/HR: 5; 900 INJECTION, SOLUTION INTRAVENOUS at 22:46

## 2020-08-31 RX ADMIN — FOLIC ACID 1 MG: 1 TABLET ORAL at 10:54

## 2020-08-31 RX ADMIN — METOPROLOL SUCCINATE 25 MG: 25 TABLET, EXTENDED RELEASE ORAL at 10:54

## 2020-08-31 RX ADMIN — PRASUGREL HYDROCHLORIDE 10 MG: 10 TABLET, FILM COATED ORAL at 10:54

## 2020-08-31 RX ADMIN — CYANOCOBALAMIN TAB 500 MCG 1000 MCG: 500 TAB at 10:55

## 2020-08-31 RX ADMIN — CEFTRIAXONE SODIUM 1000 MG: 10 INJECTION, POWDER, FOR SOLUTION INTRAVENOUS at 11:26

## 2020-08-31 RX ADMIN — DEXTROSE 50 % IN WATER (D50W) INTRAVENOUS SYRINGE 25 ML: at 06:46

## 2020-08-31 NOTE — ASSESSMENT & PLAN NOTE
· Reports was experiencing chest pressure this am after receiving D10 and regaining consciousness  States chest pressure is similar to discomfort during previous MI  · S/P CABG  · Per records underwent cardiac cath on 8/5/20 at [facility] which reveals severe multivessel disease  · 12 lead EKG obtained in ED revealed new T wave inversion in inferior leads III and aVF  · Initial troponin (-)   Will trend x 3  · JACKIE = 4  · Cardiology consult  · Continue home dose ASA, Lipitor, Isordil, Vasotec (equivalent)Toprol XL and Effient

## 2020-08-31 NOTE — ED NOTES
Pt given obi crackers and orange juice for BG of 53, pt remains A/O and asymptomatic at this time, will recheck BG in about 30 mins     Krysta Rodriguez RN  08/31/20 5551

## 2020-08-31 NOTE — PLAN OF CARE
Problem: Potential for Falls  Goal: Patient will remain free of falls  Description: INTERVENTIONS:  - Assess patient frequently for physical needs  -  Identify cognitive and physical deficits and behaviors that affect risk of falls    -  [place] fall precautions as indicated by assessment   - Educate patient/family on patient safety including physical limitations  - Instruct patient to call for assistance with activity based on assessment  - Modify environment to reduce risk of injury  - Consider OT/PT consult to assist with strengthening/mobility  Outcome: Progressing     Problem: PAIN - ADULT  Goal: Verbalizes/displays adequate comfort level or baseline comfort level  Description: Interventions:  - Encourage patient to monitor pain and request assistance  - Assess pain using appropriate pain scale  - Administer analgesics based on type and severity of pain and evaluate response  - Implement non-pharmacological measures as appropriate and evaluate response  - Consider cultural and social influences on pain and pain management  - Notify physician/advanced practitioner if interventions unsuccessful or patient reports new pain  Outcome: Progressing     Problem: INFECTION - ADULT  Goal: Absence or prevention of progression during hospitalization  Description: INTERVENTIONS:  - Assess and monitor for signs and symptoms of infection  - Monitor lab/diagnostic results  - Monitor all insertion sites, i e  indwelling lines, tubes, and drains  - Monitor endotracheal if appropriate and nasal secretions for changes in amount and color  - [place] appropriate cooling/warming therapies per order  - Administer medications as ordered  - Instruct and encourage patient and family to use good hand hygiene technique  - Identify and instruct in appropriate isolation precautions for identified infection/condition  Outcome: Progressing  Goal: Absence of fever/infection during neutropenic period  Description: INTERVENTIONS:  - Monitor WBC    Outcome: Progressing     Problem: SAFETY ADULT  Goal: Patient will remain free of falls  Description: INTERVENTIONS:  - Assess patient frequently for physical needs  -  Identify cognitive and physical deficits and behaviors that affect risk of falls    -  [place] fall precautions as indicated by assessment   - Educate patient/family on patient safety including physical limitations  - Instruct patient to call for assistance with activity based on assessment  - Modify environment to reduce risk of injury  - Consider OT/PT consult to assist with strengthening/mobility  Outcome: Progressing  Goal: Maintain or return to baseline ADL function  Description: INTERVENTIONS:  -  Assess patient's ability to carry out ADLs; assess patient's baseline for ADL function and identify physical deficits which impact ability to perform ADLs (bathing, care of mouth/teeth, toileting, grooming, dressing, etc )  - Assess/evaluate cause of self-care deficits   - Assess range of motion  - Assess patient's mobility; develop plan if impaired  - Assess patient's need for assistive devices and provide as appropriate  - Encourage maximum independence but intervene and supervise when necessary  - Involve family in performance of ADLs  - Assess for home care needs following discharge   - Consider OT consult to assist with ADL evaluation and planning for discharge  - Provide patient education as appropriate  Outcome: Progressing  Goal: Maintain or return mobility status to optimal level  Description: INTERVENTIONS:  - Assess patient's baseline mobility status (ambulation, transfers, stairs, etc )    - Identify cognitive and physical deficits and behaviors that affect mobility  - Identify mobility aids required to assist with transfers and/or ambulation (gait belt, sit-to-stand, lift, walker, cane, etc )  - [place] fall precautions as indicated by assessment  - Record patient progress and toleration of activity level on Mobility SBAR; progress patient to next Phase/Stage  - Instruct patient to call for assistance with activity based on assessment  - Consider rehabilitation consult to assist with strengthening/weightbearing, etc   Outcome: Progressing     Problem: DISCHARGE PLANNING  Goal: Discharge to home or other facility with appropriate resources  Description: INTERVENTIONS:  - Identify barriers to discharge w/patient and caregiver  - Arrange for needed discharge resources and transportation as appropriate  - Identify discharge learning needs (meds, wound care, etc )  - Arrange for interpretive services to assist at discharge as needed  - Refer to Case Management Department for coordinating discharge planning if the patient needs post-hospital services based on physician/advanced practitioner order or complex needs related to functional status, cognitive ability, or social support system  Outcome: Progressing     Problem: Knowledge Deficit  Goal: Patient/family/caregiver demonstrates understanding of disease process, treatment plan, medications, and discharge instructions  Description: Complete learning assessment and assess knowledge base    Interventions:  - Provide teaching at level of understanding  - Provide teaching via preferred learning methods  Outcome: Progressing

## 2020-08-31 NOTE — ASSESSMENT & PLAN NOTE
· BP adequately controlled on current regimen  · Continue home dose Vasotec (equivalent), Isordil and Toprol XL  · Monitor BP per unit protocol

## 2020-08-31 NOTE — ASSESSMENT & PLAN NOTE
· Per ED provider was found unresponsive by wife who called EMS  On their arrival glucose was found to be 28mg/dL  Received D10 with normalization of glucose and mentation  States ate dinner last night but no HS snack  Denies recent illness  States takes his Lantus in am  Denies any recent changes in medications    · Glucose in , 101, 53  · 1/2 amp of D50 now, then D5NS at 75mL/hr  · POCT glucose q2hrs x 6, then [facility] & HS w/ SSI  · Hold home dose Glucophage, Glipizide and januvia while inpt  · Hold home dose Lantus for now

## 2020-08-31 NOTE — CONSULTS
Consultation - Cardiology   Augusto Edmonds 69 y o  male MRN: [id number]  Unit/Bed#: -01 Encounter: [id number]  08/31/20  3:03 PM    Assessment/ Plan: 1  Hypoglycemia  Patient presents with severe hypolycemia, altered mental status, is return to normal mentation after D10 infusion  Reports of diarrhea 8-10 episodes daily  On home regimen of insulin, glipizide and sitagliptin, metformin  Management as primary    2  Atypical chest pain  The patient reports of chest pain substernal region, nonradiating associated with shortness of breath  Currently patient is chest pain free  Troponins trend-0 02>0 18  EKG reviewed-nonspecific T-wave inversions in AVF and lead 3  No ST segment changes in other  leads  Patient underwent elective left heart catheterization on 08/05/2020-patent LIMA-LAD, patent SVG-om, occluded SVG-RCA, intervention not performed  Given occluded graft, patient is not amenable for any intervention  Advised medical optimization  Will increase dose of Imdur to 60 mg daily  Continue dual antiplatelets-aspirin and prasugrel  Continue statin, beta blockers and Ace inhibitors  Patient strictly advised to refrain from smoking  Advised to follow-up with outpatient cardiologist within 1 week  3  Diarrhea  Reported multiple episodes of diarrhea since the beginning of the month  Patient is being tested for C diff  Management as per primary    4  PAD  Patient reports of gluteal pain on ambulation  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  Advised vascular surgery follow up on discharge    5   CKD  Creatinine stable    History of Present Illness   Physician Requesting Consult: Collins Figueroa MD  Reason for Consult / Principal Problem: CHEST PAIN  HPI:   This is a 76-year-old male with past medical history of coronary artery disease status post CABG around 14 years ago, multiple angioplasties around 7-8 years ago, carotid endarterectomy around 10 years ago, hypertension chronic kidney disease ,diabetes  As per as medical records patient was found unresponsive by the wife who called the EMS, patient's blood glucose was found to be 28, patient received D10 in the field with normalization of glucose and mentation  Patient reports after regained consciousness he felt pressure type of chest pain in the substernal region, nonradiating, patient rated as 8/10 over pain scale  Patient does report of associated shortness of breath, no reported episodes of vomiting  Patient did not feel nauseous  Patient reports he had NSTEMI recently in the beginning of the month and was admitted in the hospital, patient underwent catheterization but reports could not perform any procedures but reports there was evidence of blockage  As reported by the patient his 1 of the 3 stents was blocked  Patient reports since he has been discharged she is having diarrhea since that day, diarrhea occurs only in the evening around 8/10 episodes daily  Patient reports he cannot walk longer distances because of pain in his gluteal region    Patient is a chronic smoker, smokes around half a pack daily, quit this August  Vital signs on admission, blood pressure-184/79, pulse rate 86, respiratory-18, saturating at 99% on room air    Labs show potassium-5 0, sodium I 33, creatinine 1 45  Troponin trend -0 02 -0 18  Patient is on dual antiplatelets, aspirin 81 and prasugrel and ramipril, glipizide, glargine 30 units q a m , isosorbide dinitrate 30 mg daily, metformin 1000 twice daily, metoprolol succinate 25 mg daily, Januvia 100 mg daily    Last echo 08/04/2020-left ventricular systolic function mildly reduced with an EF of 41-45%,, mildly increased left ventricular cavity size, moderate mitral regurgitation, mild pulmonary hypertension    Patient underwent elective left heart catheterization on 08/05/2020-patent LIMA-LAD, patent SVG-om, at occluded SVG-RCA, intervention not performed          Inpatient consult to Cardiology     Performed by  Marin Dorantes MD     Authorized by Geovanny Gagnon PA-C              EKG: Sinus rhythm, T-wave inversions in lead 3 and  aVF      Review of Systems   Constitutional: Positive for activity change  Negative for appetite change, chills, diaphoresis, fatigue, fever and unexpected weight change  HENT: Negative for congestion  Respiratory: Negative for apnea, cough, choking, chest tightness, shortness of breath, wheezing and stridor  Cardiovascular: Positive for chest pain  Negative for palpitations and leg swelling  Gastrointestinal: Positive for diarrhea  Negative for nausea and vomiting  Endocrine: Negative for cold intolerance and heat intolerance  Genitourinary: Negative for difficulty urinating and dysuria  Musculoskeletal: Negative for arthralgias  Neurological: Negative for dizziness and facial asymmetry  Hematological: Negative for adenopathy  Psychiatric/Behavioral: Negative for agitation  Historical Information   Past Medical History:   Diagnosis Date    Coronary artery disease     Diabetes mellitus ([facility] [address] )     Hyperlipidemia     Hypertension      Past Surgical History:   Procedure Laterality Date    CORONARY ARTERY BYPASS GRAFT       Social History     Substance and Sexual Activity   Alcohol Use Never    Frequency: Never     Social History     Substance and Sexual Activity   Drug Use Never     Social History     Tobacco Use   Smoking Status Current Every Day Smoker    Packs/day: 0 25    Types: Cigarettes   Smokeless Tobacco Never Used   Tobacco Comment    States quit 1 month ago       Family History:   Family History   Family history unknown: Yes       Meds/Allergies   all current active meds have been reviewed  No Known Allergies    Objective   Vitals: Blood pressure 163/71, pulse 71, temperature 98 5 °F (36 9 °C), resp  rate 18, height 5' 8" (1 727 m), weight 70 kg (154 lb 5 2 oz), SpO2 98 %  , Body mass index is 23 46 kg/m² ,   Orthostatic Blood Pressures      Most Recent Value   Blood Pressure  163/71 filed at 08/31/2020 0932   Patient Position - Orthostatic VS  Sitting filed at 08/31/2020 5813          Systolic (87NHB), WWF:247 , Min:120 , HNE:470     Diastolic (34ICW), SLF:60, Min:55, Max:79        Intake/Output Summary (Last 24 hours) at 8/31/2020 1503  Last data filed at 8/31/2020 1319  Gross per 24 hour   Intake 240 ml   Output --   Net 240 ml       Invasive Devices     Peripheral Intravenous Line            Peripheral IV 08/31/20 Left;Ventral (anterior) Hand less than 1 day    Peripheral IV 08/31/20 Right Antecubital less than 1 day                    Physical Exam:  GEN: Alert and oriented x 3, in no acute distress  Well appearing and well nourished  HEENT: Sclera anicteric, conjunctivae pink, mucous membranes moist  Oropharynx clear  NECK: Supple, no carotid bruits, no significant JVD  Trachea midline, no thyromegaly  HEART: Regular rhythm, normal S1 and S2, no murmurs, clicks, gallops or rubs  PMI nondisplaced, no thrills  LUNGS: Clear to auscultation bilaterally; no wheezes, rales, or rhonchi  No increased work of breathing or signs of respiratory distress  ABDOMEN: Soft, nontender, nondistended, normoactive bowel sounds  EXTREMITIES: Skin warm and well perfused, no clubbing, cyanosis, or edema  NEURO: No focal findings  Normal speech  Mood and affect normal    SKIN: Normal without suspicious lesions on exposed skin        Lab Results:     Troponins:   Results from last 7 days   Lab Units 08/31/20  1318 08/31/20  0421   TROPONIN I ng/mL 0 18* 0 02       CBC with diff:   Results from last 7 days   Lab Units 08/31/20  1027 08/31/20  0421   WBC Thousand/uL  --  9 92   HEMOGLOBIN g/dL  --  9 9*   HEMATOCRIT %  --  32 4*   MCV fL  --  87   PLATELETS Thousands/uL 173 195   MCH pg  --  26 6*   MCHC g/dL  --  30 6*   RDW %  --  14 4   MPV fL 11 4 10 7   NRBC AUTO /100 WBCs  --  0         CMP:   Results from last 7 days   Lab Units 08/31/20  0421   POTASSIUM mmol/L 5 0   CHLORIDE mmol/L 99*   CO2 mmol/L 27   BUN mg/dL 19   CREATININE mg/dL 1 45*   CALCIUM mg/dL 8 9   AST U/L 15   ALT U/L 13   ALK PHOS U/L 71   EGFR ml/min/1 73sq m 48

## 2020-08-31 NOTE — H&P
H&P- Patricio Gottron 1950, 69 y o  male MRN: [id number]    Unit/Bed#: ED 29 Encounter: [id number]    Primary Care Provider: Carlette Landau   Date and time admitted to hospital: 8/31/2020  3:53 AM        * Hypoglycemia  Assessment & Plan  · Per ED provider was found unresponsive by wife who called EMS  On their arrival glucose was found to be 28mg/dL  Received D10 with normalization of glucose and mentation  States ate dinner last night but no HS snack  Denies recent illness  States takes his Lantus in am  Denies any recent changes in medications  · Glucose in , 101, 53  · 1/2 amp of D50 now, then D5NS at 75mL/hr  · POCT glucose q2hrs x 6, then [facility] & HS w/ SSI  · Hold home dose Glucophage, Glipizide and januvia while inpt  · Hold home dose Lantus for now    Chest pressure  Assessment & Plan  · Reports was experiencing chest pressure this am after receiving D10 and regaining consciousness  States chest pressure is similar to discomfort during previous MI  · S/P CABG  · Per records underwent cardiac cath on 8/5/20 at [facility] which reveals severe multivessel disease  · 12 lead EKG obtained in ED revealed new T wave inversion in inferior leads III and aVF  · Initial troponin (-)  Will trend x 3  · JACKIE = 4  · Cardiology consult  · Continue home dose ASA, Lipitor, Isordil, Vasotec (equivalent)Toprol XL and Effient    Acute cystitis without hematuria  Assessment & Plan  · UA obtained in ED revealed Lg blood, small leukocytes, 30-50 RBC, 10-20 WBC and occasional bacteria   Denies any current symptoms  · Will initiate Rocephin IV daily pending culture results    Stage 3 chronic kidney disease ([facility] [address] )  Assessment & Plan  · Creatinine 1 45 on admission which is at baseline  · Avoid hypotension and nephrotoxic agents  · Received 1L NS in ED  · D5NS @ 75mL/hr as above  · BMP tomorrow am    Essential hypertension  Assessment & Plan  · BP adequately controlled on current regimen  · Continue home dose Vasotec (equivalent), Isordil and Toprol XL  · Monitor BP per unit protocol    Normocytic anemia  Assessment & Plan  · Hb stable at 9 9 on admission  MCV 87  · Likely 2/2 CKD as above  · CBC tomorrow am      VTE Prophylaxis: Heparin  / sequential compression device   Code Status: Level 1 Full Code  POLST: POLST form is not discussed and not completed at this time  Discussion with family: NA    Anticipated Length of Stay:  Patient will be admitted on an Observation basis with an anticipated length of stay of  Less than 2 midnights  Justification for Hospital Stay: See AP above    Total Time for Visit, including Counseling / Coordination of Care: 45 minutes  Greater than 50% of this total time spent on direct patient counseling and coordination of care  Chief Complaint:   Hypoglycemia and chest pain    History of Present Illness:    Shanda Vang is a 69 y o  male who presents with hypoglycemia and chest pain  Per ED provider was found unresponsive by wife who called EMS  On their arrival glucose was found to be 28mg/dL  Received D10 with normalization of glucose and mentation  States ate dinner last night but no HS snack  Denies recent illness  States takes his Lantus in am  Denies any recent changes in medications  Also with c/o experiencing chest pressure this am after receiving D10 and regaining consciousness  States chest pressure is similar to discomfort during previous MI  S/P CABG  Per records underwent cardiac cath on 8/5/20 at [facility] which reveals severe multivessel disease    Review of Systems:    Review of Systems   Constitutional: Negative for appetite change, chills and fever  HENT: Negative for congestion, ear pain, sinus pressure and sore throat  Eyes: Negative for visual disturbance  Respiratory: Negative for cough, shortness of breath and wheezing  Cardiovascular: Positive for chest pain (pressure)  Negative for palpitations and leg swelling  Gastrointestinal: Positive for diarrhea  Negative for abdominal pain, nausea and vomiting  Genitourinary: Negative for difficulty urinating, dysuria and frequency  Musculoskeletal: Negative for neck pain and neck stiffness  Neurological: Negative for dizziness, syncope, light-headedness and headaches  All other systems reviewed and are negative  Past Medical and Surgical History:     Past Medical History:   Diagnosis Date    Coronary artery disease     Diabetes mellitus ([facility] [address] )     Hyperlipidemia     Hypertension        Past Surgical History:   Procedure Laterality Date    CORONARY ARTERY BYPASS GRAFT         Meds/Allergies:    Prior to Admission medications    Medication Sig Start Date End Date Taking?  Authorizing Provider   aspirin 81 mg chewable tablet Chew 81 mg daily   Yes Historical Provider, MD   atorvastatin (LIPITOR) 20 mg tablet Take 20 mg by mouth daily   Yes Historical Provider, MD   cyanocobalamin (VITAMIN B-12) 1,000 mcg tablet Take by mouth daily   Yes Historical Provider, MD   enalapril (VASOTEC) 10 mg tablet Take 10 mg by mouth daily   Yes Historical Provider, MD   famotidine (PEPCID) 20 mg tablet Take 20 mg by mouth daily   Yes Historical Provider, MD   folic acid (FOLVITE) 1 mg tablet Take 1 mg by mouth daily    Yes Historical Provider, MD   furosemide (LASIX) 20 mg tablet Take 10 mg by mouth daily   Yes Historical Provider, MD   gabapentin (NEURONTIN) 300 mg capsule Take 100 mg by mouth 2 (two) times a day     Yes Historical Provider, MD   glipiZIDE (GLUCOTROL) 5 mg tablet Take 5 mg by mouth 2 (two) times a day before meals   Yes Historical Provider, MD   insulin glargine (LANTUS) 100 units/mL subcutaneous injection Inject 30 Units under the skin daily   Yes Historical Provider, MD   isosorbide dinitrate (ISORDIL) 30 mg tablet Take 30 mg by mouth daily   Yes Historical Provider, MD   metFORMIN (GLUCOPHAGE) 1000 MG tablet Take 1,000 mg by mouth 2 (two) times a day with meals Yes Historical Provider, MD   metoprolol succinate (TOPROL-XL) 25 mg 24 hr tablet Take 25 mg by mouth daily    Yes Historical Provider, MD   prasugrel (EFFIENT) tablet Take 10 mg by mouth daily    Yes Historical Provider, MD   sitaGLIPtin (JANUVIA) 100 mg tablet Take 100 mg by mouth daily   Yes Historical Provider, MD   HYDROcodone-acetaminophen (NORCO) 5-325 mg per tablet Take 1 tablet by mouth every 6 (six) hours as needed (Not relieved by Anti-inflammatory) for up to 12 dosesMax Daily Amount: 4 tablets 6/12/19 8/31/20  Luwanna Right, CRNP   nitroglycerin (NITRODUR) 0 2 mg/hr Place 1 patch on the skin as needed    8/31/20  Historical Provider, MD     I have reviewed home medications with patient personally  Allergies: No Known Allergies    Social History:     Marital Status: /Civil Union   Patient Pre-hospital Living Situation: Lives with wife  Patient Pre-hospital Level of Mobility: Ambulatory  Patient Pre-hospital Diet Restrictions: None  Substance Use History:   Social History     Substance and Sexual Activity   Alcohol Use Never    Frequency: Never     Social History     Tobacco Use   Smoking Status Current Every Day Smoker    Packs/day: 0 25    Types: Cigarettes   Smokeless Tobacco Never Used   Tobacco Comment    States quit 1 month ago     Social History     Substance and Sexual Activity   Drug Use Never       Family History:    Family History   Family history unknown: Yes       Physical Exam:     Vitals:   Blood Pressure: 120/58 (08/31/20 0530)  Pulse: 72 (08/31/20 0530)  Temperature: 98 2 °F (36 8 °C) (08/31/20 0404)  Temp Source: Oral (08/31/20 0404)  Respirations: 17 (08/31/20 0530)  Weight - Scale: 70 kg (154 lb 5 2 oz) (08/31/20 0404)  SpO2: 98 % (08/31/20 0530)    Physical Exam  Vitals signs reviewed  Constitutional:       General: He is not in acute distress  Appearance: Normal appearance  HENT:      Head: Normocephalic and atraumatic        Mouth/Throat:      Mouth: Mucous membranes are moist    Eyes:      Extraocular Movements: Extraocular movements intact  Neck:      Musculoskeletal: Normal range of motion and neck supple  Cardiovascular:      Rate and Rhythm: Normal rate and regular rhythm  Pulses: Normal pulses  Heart sounds: Normal heart sounds  No murmur  No friction rub  No gallop  Pulmonary:      Effort: Pulmonary effort is normal  No respiratory distress  Breath sounds: Normal breath sounds  No wheezing or rales  Abdominal:      Palpations: Abdomen is soft  Tenderness: There is no abdominal tenderness  There is no guarding or rebound  Musculoskeletal: Normal range of motion  General: No swelling or tenderness  Skin:     General: Skin is warm and dry  Neurological:      General: No focal deficit present  Mental Status: He is alert and oriented to person, place, and time  Psychiatric:         Mood and Affect: Mood normal          Behavior: Behavior normal          Thought Content: Thought content normal          Additional Data:     Lab Results: I have personally reviewed pertinent reports        Results from last 7 days   Lab Units 08/31/20  0421   WBC Thousand/uL 9 92   HEMOGLOBIN g/dL 9 9*   HEMATOCRIT % 32 4*   PLATELETS Thousands/uL 195   NEUTROS PCT % 78*   LYMPHS PCT % 14   MONOS PCT % 6   EOS PCT % 1     Results from last 7 days   Lab Units 08/31/20  0421   SODIUM mmol/L 133*   POTASSIUM mmol/L 5 0   CHLORIDE mmol/L 99*   CO2 mmol/L 27   BUN mg/dL 19   CREATININE mg/dL 1 45*   ANION GAP mmol/L 7   CALCIUM mg/dL 8 9   ALBUMIN g/dL 2 9*   TOTAL BILIRUBIN mg/dL 0 30   ALK PHOS U/L 71   ALT U/L 13   AST U/L 15   GLUCOSE RANDOM mg/dL 101         Results from last 7 days   Lab Units 08/31/20  0606 08/31/20  0358   POC GLUCOSE mg/dl 53* 111               Imaging: NA    XR chest portable    (Results Pending)       EKG, Pathology, and Other Studies Reviewed on Admission:   · EKG: SR w/ T wave inversion in leads III and aVF    Allscripts / Epic Records Reviewed: Yes     ** Please Note: This note has been constructed using a voice recognition system   **

## 2020-08-31 NOTE — ED NOTES
1  CC Hypoglycemia -symptomatic     2  Is this admission due to an injury? No     3  Orientation status  A&Oxs 4    4  Abnormal labs/abnormal focused assessment/abnormal vitals Last blood glucose = 163    5  Medications/ drips D5 in [facility]     6  Last time narcotics were given/ pain medication    7  IV lines/drains/ etc  20RAC     8  Isolation status none     9  Skin WDL     10  Ambulation status Ambulatory     11   ED phone # [phone]     Sergei Garland RN  08/31/20 5970

## 2020-08-31 NOTE — ASSESSMENT & PLAN NOTE
· UA obtained in ED revealed Lg blood, small leukocytes, 30-50 RBC, 10-20 WBC and occasional bacteria   Denies any current symptoms  · Will initiate Rocephin IV daily pending culture results

## 2020-08-31 NOTE — CASE MANAGEMENT
LOS 7 HOURS  RISK OF UNPLANNED READMISSION SCORE N/A  30 DAY READMISSION: NO  BUNDLE: NO    Per chart review, patient presented to [facility] ED by EMS due to being found unresponsive  Patient had cardiac cath on 8/5/2020 at [facility] which found sever multi-vessel disease  Cultures pending at this time, cardio consulted, and hypoglycemia home doses held at this time  CM to complete full assessment and follow through discharge

## 2020-08-31 NOTE — ASSESSMENT & PLAN NOTE
· Creatinine 1 45 on admission which is at baseline  · Avoid hypotension and nephrotoxic agents  · Received 1L NS in ED  · D5NS @ 75mL/hr as above  · BMP tomorrow am

## 2020-08-31 NOTE — ED NOTES
CC- hypoglycemia     Admission related to injury?- no    Orientation status- a/o x4    Abnormal labs/abnormal focused assessment/vitals- VSS     Medication/drips- n/a    Last time narcotics given-n/a     IV lines/drains/etc- 18 LH PH 20 RAC     Isolation status- n/a    Skin- wnl    BMAT screening tool-?independent    ED nurse's name and phone number- [phone] Eleanor Pink RN  08/31/20 [id number]

## 2020-09-01 VITALS
TEMPERATURE: 98.7 F | DIASTOLIC BLOOD PRESSURE: 63 MMHG | WEIGHT: 154.32 LBS | HEIGHT: 68 IN | RESPIRATION RATE: 17 BRPM | OXYGEN SATURATION: 99 % | HEART RATE: 66 BPM | SYSTOLIC BLOOD PRESSURE: 163 MMHG | BODY MASS INDEX: 23.39 KG/M2

## 2020-09-01 PROBLEM — R19.7 DIARRHEA: Status: ACTIVE | Noted: 2020-09-01

## 2020-09-01 LAB
ANION GAP SERPL CALCULATED.3IONS-SCNC: 7 MMOL/L (ref 4–13)
ATRIAL RATE: 71 BPM
BUN SERPL-MCNC: 14 MG/DL (ref 5–25)
CALCIUM SERPL-MCNC: 8.7 MG/DL (ref 8.3–10.1)
CHLORIDE SERPL-SCNC: 104 MMOL/L (ref 100–108)
CHOLEST SERPL-MCNC: 53 MG/DL (ref 50–200)
CO2 SERPL-SCNC: 26 MMOL/L (ref 21–32)
CREAT SERPL-MCNC: 1.35 MG/DL (ref 0.6–1.3)
GFR SERPL CREATININE-BSD FRML MDRD: 52 ML/MIN/1.73SQ M
GLUCOSE SERPL-MCNC: 148 MG/DL (ref 65–140)
GLUCOSE SERPL-MCNC: 158 MG/DL (ref 65–140)
GLUCOSE SERPL-MCNC: 197 MG/DL (ref 65–140)
GLUCOSE SERPL-MCNC: 288 MG/DL (ref 65–140)
HDLC SERPL-MCNC: 14 MG/DL
LDLC SERPL CALC-MCNC: 17 MG/DL (ref 0–100)
P AXIS: 70 DEGREES
POTASSIUM SERPL-SCNC: 4.3 MMOL/L (ref 3.5–5.3)
PR INTERVAL: 190 MS
QRS AXIS: 50 DEGREES
QRSD INTERVAL: 90 MS
QT INTERVAL: 394 MS
QTC INTERVAL: 428 MS
SODIUM SERPL-SCNC: 137 MMOL/L (ref 136–145)
T WAVE AXIS: 12 DEGREES
TRIGL SERPL-MCNC: 110 MG/DL
VENTRICULAR RATE: 71 BPM

## 2020-09-01 PROCEDURE — 80048 BASIC METABOLIC PNL TOTAL CA: CPT | Performed by: STUDENT IN AN ORGANIZED HEALTH CARE EDUCATION/TRAINING PROGRAM

## 2020-09-01 PROCEDURE — 93010 ELECTROCARDIOGRAM REPORT: CPT | Performed by: INTERNAL MEDICINE

## 2020-09-01 PROCEDURE — 99285 EMERGENCY DEPT VISIT HI MDM: CPT | Performed by: EMERGENCY MEDICINE

## 2020-09-01 PROCEDURE — 80061 LIPID PANEL: CPT | Performed by: PHYSICIAN ASSISTANT

## 2020-09-01 PROCEDURE — 82948 REAGENT STRIP/BLOOD GLUCOSE: CPT

## 2020-09-01 PROCEDURE — 99214 OFFICE O/P EST MOD 30 MIN: CPT | Performed by: INTERNAL MEDICINE

## 2020-09-01 PROCEDURE — 99217 PR OBSERVATION CARE DISCHARGE MANAGEMENT: CPT | Performed by: FAMILY MEDICINE

## 2020-09-01 RX ORDER — LANCETS 28 GAUGE
EACH MISCELLANEOUS
Qty: 100 EACH | Refills: 0 | Status: SHIPPED | OUTPATIENT
Start: 2020-09-01

## 2020-09-01 RX ORDER — INSULIN GLARGINE 100 [IU]/ML
15 INJECTION, SOLUTION SUBCUTANEOUS DAILY
Refills: 0
Start: 2020-09-01

## 2020-09-01 RX ORDER — ISOSORBIDE DINITRATE 30 MG/1
60 TABLET ORAL DAILY
Qty: 30 TABLET | Refills: 0 | Status: SHIPPED | OUTPATIENT
Start: 2020-09-02 | End: 2020-09-01 | Stop reason: HOSPADM

## 2020-09-01 RX ORDER — CEPHALEXIN 500 MG/1
500 CAPSULE ORAL EVERY 12 HOURS SCHEDULED
Qty: 6 CAPSULE | Refills: 0 | Status: SHIPPED | OUTPATIENT
Start: 2020-09-01 | End: 2020-09-04

## 2020-09-01 RX ORDER — ISOSORBIDE DINITRATE 10 MG/1
60 TABLET ORAL DAILY
Status: DISCONTINUED | OUTPATIENT
Start: 2020-09-02 | End: 2020-09-01 | Stop reason: HOSPADM

## 2020-09-01 RX ADMIN — METOPROLOL SUCCINATE 25 MG: 25 TABLET, EXTENDED RELEASE ORAL at 09:22

## 2020-09-01 RX ADMIN — HEPARIN SODIUM 5000 UNITS: 5000 INJECTION INTRAVENOUS; SUBCUTANEOUS at 06:45

## 2020-09-01 RX ADMIN — FUROSEMIDE 10 MG: 20 TABLET ORAL at 09:22

## 2020-09-01 RX ADMIN — LISINOPRIL 20 MG: 20 TABLET ORAL at 09:22

## 2020-09-01 RX ADMIN — CYANOCOBALAMIN TAB 500 MCG 1000 MCG: 500 TAB at 09:22

## 2020-09-01 RX ADMIN — ASPIRIN 81 MG: 81 TABLET, CHEWABLE ORAL at 09:22

## 2020-09-01 RX ADMIN — PRASUGREL HYDROCHLORIDE 10 MG: 10 TABLET, FILM COATED ORAL at 09:22

## 2020-09-01 RX ADMIN — FOLIC ACID 1 MG: 1 TABLET ORAL at 09:22

## 2020-09-01 RX ADMIN — GABAPENTIN 100 MG: 100 CAPSULE ORAL at 09:22

## 2020-09-01 RX ADMIN — INSULIN LISPRO 3 UNITS: 100 INJECTION, SOLUTION INTRAVENOUS; SUBCUTANEOUS at 12:10

## 2020-09-01 RX ADMIN — HEPARIN SODIUM 5000 UNITS: 5000 INJECTION INTRAVENOUS; SUBCUTANEOUS at 15:59

## 2020-09-01 RX ADMIN — CEFTRIAXONE SODIUM 1000 MG: 10 INJECTION, POWDER, FOR SOLUTION INTRAVENOUS at 09:24

## 2020-09-01 RX ADMIN — ATORVASTATIN CALCIUM 20 MG: 20 TABLET, FILM COATED ORAL at 09:21

## 2020-09-01 RX ADMIN — FAMOTIDINE 20 MG: 20 TABLET, FILM COATED ORAL at 09:22

## 2020-09-01 RX ADMIN — INSULIN LISPRO 1 UNITS: 100 INJECTION, SOLUTION INTRAVENOUS; SUBCUTANEOUS at 17:03

## 2020-09-01 RX ADMIN — ISOSORBIDE DINITRATE 30 MG: 10 TABLET ORAL at 09:22

## 2020-09-01 NOTE — ASSESSMENT & PLAN NOTE
Patient reports history of diarrhea, however, since admission he has not had a bowel movement, therefore, we were not able to do any studies on his stool  I recommend to follow-up on the outpatient basis for any further studies if diarrhea recurs

## 2020-09-01 NOTE — DISCHARGE SUMMARY
Discharge- Michael Ingram 1950, 69 y o  male MRN: [id number]    Unit/Bed#: -01 Encounter: [id number]    Primary Care Provider: Meliza Bentley   Date and time admitted to hospital: 8/31/2020  3:53 AM        Diarrhea  Assessment & Plan  Patient reports history of diarrhea, however, since admission he has not had a bowel movement, therefore, we were not able to do any studies on his stool  I recommend to follow-up on the outpatient basis for any further studies if diarrhea recurs  Acute cystitis without hematuria  Assessment & Plan  · UA obtained in ED revealed Lg blood, small leukocytes, 30-50 RBC, 10-20 WBC and occasional bacteria  Denies any current symptoms  · Unfortunately, urine culture is not available at this time, however, I would treat this patient due to abnormal UA and history of diabetes  · We will provide with course of Keflex upon discharge      Stage 3 chronic kidney disease ([address] )  Assessment & Plan  · Creatinine 1 45 on admission which is at baseline  · Avoid hypotension and nephrotoxic agents  ·     Chest pressure  Assessment & Plan  · Reports was experiencing chest pressure this am after receiving D10 and regaining consciousness   States chest pressure is similar to discomfort during previous MI  · S/P CABG  · Per records underwent cardiac cath on 8/5/20 at [facility] which reveals severe multivessel disease  · 12 lead EKG obtained in ED revealed new T wave inversion in inferior leads III and aVF  · Noted slight increase in troponin at 0 18  ·  JACKIE = 4  · Cardiology has seen the patient and they do not recommend any acute interventions at this time  · Plan for to provide with ice a drill for chest discomfort  · Continue home dose ASA, Lipitor, Isordil, Vasotec (equivalent)Toprol XL and Effient    Essential hypertension  Assessment & Plan  · BP adequately controlled on current regimen  · Continue home dose Vasotec (equivalent), Isordil and Toprol XL  · Monitor BP per unit protocol    * Hypoglycemia  Assessment & Plan  · Per ED provider was found unresponsive by wife who called EMS  On their arrival glucose was found to be 28mg/dL  Received D10 with normalization of glucose and mentation  States ate dinner last night but no HS snack  Denies recent illness  States takes his Lantus in am  Denies any recent changes in medications  · Plan to cut Lantus in half and provide with 15 units at night in addition to Januvia, metformin  · Stop glipizide  · Discussed with the patient that his hemoglobin A1c is currently improved at 6 3% and he requires adjustment of diabetic medications  Discharge Summary - [facility]    Patient Information: Herrera Puri 69 y o  male MRN: [id number]  Unit/Bed#: -01 Encounter: [id number]    Discharging Physician / Practitioner: Shanika Choudhary MD  PCP: Jena Dixon  Admission Date: 8/31/2020  Discharge Date: 09/01/20    Reason for Admission:  Hypoglycemia    Discharge Diagnoses:     Principal Problem:    Hypoglycemia  Active Problems:    Essential hypertension    Chest pressure    Stage 3 chronic kidney disease ([address] )    Normocytic anemia    Acute cystitis without hematuria    Diarrhea  Resolved Problems:    * No resolved hospital problems  *      Consultations During Hospital Stay:  · Cardiology    Procedures Performed:     · None    Significant Findings / Test Results:     · Troponin 0 18 x2    Incidental Findings:   · Hemoglobin A1c 6 3%    Test Results Pending at Discharge (will require follow up): · None     Outpatient Tests Requested:  · None    Complications:  None    Hospital Course:     Herrera Puri is a 69 y o  male patient who originally presented to the hospital on 8/31/2020 due to being found unresponsive by wife who called EMS  On their arrival glucose was found to be 28mg/dL  Received D10 with normalization of glucose and mentation  States ate dinner last night but no HS snack  Denies recent illness   States takes his Lantus in am  Denies any recent changes in medications  Also with c/o experiencing chest pressure this am after receiving D10 and regaining consciousness  States chest pressure is similar to discomfort during previous MI  S/P CABG  Per records underwent cardiac cath on 8/5/20 at [facility] which reveals severe multivessel disease  As part of the hospitalization, patient was seen by the cardiologist due to chest pain  He had slightly elevated troponin at 0 18, however, per Cardiology, no acute recommendations are required currently because there was no suspicion of acute coronary syndrome at this time  Chest pressure is currently resolved  Patient was recommended to continue eyes a drill upon discharge for chest discomfort  In terms of hypoglycemia, we recommend to decrease his Lantus regimen to half of the dose that patient used to take at home, which is 15 units at night  Will stop glipizide  Patient should continue metformin and Januvia  He has hemoglobin A1c significantly improved at 6 3%, which is much better than his glycemic control was in 2018  Discussed with the patient that he should continue checking his blood glucose 3 times a day or when he experiences symptoms of hypoglycemia  He will follow-up with his PCP for further adjustments in his insulin regimen  Patient has also been concerned about episodes of diarrhea that he has been experience in since last admission to the hospital on 08/05/2020  However, patient has not had any episodes of diarrhea since the hospitalization and, therefore, we were not able to conduct any studies to assess the etiology of diarrhea  I recommend that he follows up with his PCP when diarrhea recurs          Condition at Discharge: stable     Discharge Day Visit / Exam:     Subjective:  Patient was seen examined  He denies any complaints of chest discomfort, shortness of breath, or diarrhea today    He denies any dizziness or weakness  Vitals: Blood Pressure: 163/63 (09/01/20 1619)  Pulse: 66 (09/01/20 1619)  Temperature: 98 7 °F (37 1 °C) (09/01/20 1619)  Temp Source: Oral (08/31/20 0404)  Respirations: 17 (09/01/20 1619)  Height: 5' 8" (172 7 cm) (08/31/20 1341)  Weight - Scale: 70 kg (154 lb 5 2 oz) (08/31/20 0404)  SpO2: 99 % (09/01/20 1619)  Exam:   Physical Exam  Constitutional:       Appearance: He is well-developed  Cardiovascular:      Rate and Rhythm: Normal rate and regular rhythm  Heart sounds: Normal heart sounds  Pulmonary:      Effort: Pulmonary effort is normal  No respiratory distress  Breath sounds: Normal breath sounds  Abdominal:      Palpations: Abdomen is soft  Tenderness: There is no abdominal tenderness  Musculoskeletal:         General: No deformity  Skin:     General: Skin is warm  Findings: No erythema or rash  Neurological:      Mental Status: He is alert  Discussion with Family:  Patient's daughter    Discharge instructions/Information to patient and family:   See after visit summary for information provided to patient and family  Provisions for Follow-Up Care:  See after visit summary for information related to follow-up care and any pertinent home health orders  Disposition:     Home    For Discharges to [facility] SNF:   · Not Applicable to this Patient - Not Applicable to this Patient    Planned Readmission: no     Discharge Statement:  I spent 45 minutes discharging the patient  This time was spent on the day of discharge  I had direct contact with the patient on the day of discharge  Greater than 50% of the total time was spent examining patient, answering all patient questions, arranging and discussing plan of care with patient as well as directly providing post-discharge instructions  Additional time then spent on discharge activities      Discharge Medications:  See after visit summary for reconciled discharge medications provided to patient and family        ** Please Note: This note has been constructed using a voice recognition system **

## 2020-09-01 NOTE — PLAN OF CARE
Problem: Potential for Falls  Goal: Patient will remain free of falls  Description: INTERVENTIONS:  - Assess patient frequently for physical needs  -  Identify cognitive and physical deficits and behaviors that affect risk of falls    -  [place] fall precautions as indicated by assessment   - Educate patient/family on patient safety including physical limitations  - Instruct patient to call for assistance with activity based on assessment  - Modify environment to reduce risk of injury  - Consider OT/PT consult to assist with strengthening/mobility  9/1/2020 1622 by Cintia Mustafa RN  Outcome: Adequate for Discharge  9/1/2020 1057 by Cintia Mustafa RN  Outcome: Progressing     Problem: PAIN - ADULT  Goal: Verbalizes/displays adequate comfort level or baseline comfort level  Description: Interventions:  - Encourage patient to monitor pain and request assistance  - Assess pain using appropriate pain scale  - Administer analgesics based on type and severity of pain and evaluate response  - Implement non-pharmacological measures as appropriate and evaluate response  - Consider cultural and social influences on pain and pain management  - Notify physician/advanced practitioner if interventions unsuccessful or patient reports new pain  9/1/2020 1622 by Cintia Mustafa RN  Outcome: Adequate for Discharge  9/1/2020 1057 by Cintia Mustafa RN  Outcome: Progressing     Problem: INFECTION - ADULT  Goal: Absence or prevention of progression during hospitalization  Description: INTERVENTIONS:  - Assess and monitor for signs and symptoms of infection  - Monitor lab/diagnostic results  - Monitor all insertion sites, i e  indwelling lines, tubes, and drains  - Monitor endotracheal if appropriate and nasal secretions for changes in amount and color  - [place] appropriate cooling/warming therapies per order  - Administer medications as ordered  - Instruct and encourage patient and family to use good hand hygiene technique  - Identify and instruct in appropriate isolation precautions for identified infection/condition  9/1/2020 1622 by Lilli Clement RN  Outcome: Adequate for Discharge  9/1/2020 1057 by Lilli Clement RN  Outcome: Progressing  Goal: Absence of fever/infection during neutropenic period  Description: INTERVENTIONS:  - Monitor WBC    9/1/2020 1622 by Lilli Clement RN  Outcome: Adequate for Discharge  9/1/2020 1057 by Lilli Clement RN  Outcome: Progressing     Problem: SAFETY ADULT  Goal: Patient will remain free of falls  Description: INTERVENTIONS:  - Assess patient frequently for physical needs  -  Identify cognitive and physical deficits and behaviors that affect risk of falls    -  [place] fall precautions as indicated by assessment   - Educate patient/family on patient safety including physical limitations  - Instruct patient to call for assistance with activity based on assessment  - Modify environment to reduce risk of injury  - Consider OT/PT consult to assist with strengthening/mobility  9/1/2020 1622 by Lilli Clement RN  Outcome: Adequate for Discharge  9/1/2020 1057 by Lilli Clement RN  Outcome: Progressing  Goal: Maintain or return to baseline ADL function  Description: INTERVENTIONS:  -  Assess patient's ability to carry out ADLs; assess patient's baseline for ADL function and identify physical deficits which impact ability to perform ADLs (bathing, care of mouth/teeth, toileting, grooming, dressing, etc )  - Assess/evaluate cause of self-care deficits   - Assess range of motion  - Assess patient's mobility; develop plan if impaired  - Assess patient's need for assistive devices and provide as appropriate  - Encourage maximum independence but intervene and supervise when necessary  - Involve family in performance of ADLs  - Assess for home care needs following discharge   - Consider OT consult to assist with ADL evaluation and planning for discharge  - Provide patient education as appropriate  9/1/2020 1622 by Vahid Jacob RN  Outcome: Adequate for Discharge  9/1/2020 1057 by Vahid Jacob RN  Outcome: Progressing  Goal: Maintain or return mobility status to optimal level  Description: INTERVENTIONS:  - Assess patient's baseline mobility status (ambulation, transfers, stairs, etc )    - Identify cognitive and physical deficits and behaviors that affect mobility  - Identify mobility aids required to assist with transfers and/or ambulation (gait belt, sit-to-stand, lift, walker, cane, etc )  - [place] fall precautions as indicated by assessment  - Record patient progress and toleration of activity level on Mobility SBAR; progress patient to next Phase/Stage  - Instruct patient to call for assistance with activity based on assessment  - Consider rehabilitation consult to assist with strengthening/weightbearing, etc   9/1/2020 1622 by Vahid Jacob RN  Outcome: Adequate for Discharge  9/1/2020 1057 by Vahid Jacob RN  Outcome: Progressing     Problem: DISCHARGE PLANNING  Goal: Discharge to home or other facility with appropriate resources  Description: INTERVENTIONS:  - Identify barriers to discharge w/patient and caregiver  - Arrange for needed discharge resources and transportation as appropriate  - Identify discharge learning needs (meds, wound care, etc )  - Arrange for interpretive services to assist at discharge as needed  - Refer to Case Management Department for coordinating discharge planning if the patient needs post-hospital services based on physician/advanced practitioner order or complex needs related to functional status, cognitive ability, or social support system  9/1/2020 1622 by Vahid Jacob RN  Outcome: Adequate for Discharge  9/1/2020 1057 by Vahid Jacob RN  Outcome: Progressing     Problem: Knowledge Deficit  Goal: Patient/family/caregiver demonstrates understanding of disease process, treatment plan, medications, and discharge instructions  Description: Complete learning assessment and assess knowledge base    Interventions:  - Provide teaching at level of understanding  - Provide teaching via preferred learning methods  9/1/2020 1622 by Tristen Ac RN  Outcome: Adequate for Discharge  9/1/2020 1057 by Tristen Ac RN  Outcome: Progressing

## 2020-09-01 NOTE — ASSESSMENT & PLAN NOTE
· Per ED provider was found unresponsive by wife who called EMS  On their arrival glucose was found to be 28mg/dL  Received D10 with normalization of glucose and mentation  States ate dinner last night but no HS snack  Denies recent illness  States takes his Lantus in am  Denies any recent changes in medications  · Plan to cut Lantus in half and provide with 15 units at night in addition to Januvia, metformin  · Stop glipizide  · Discussed with the patient that his hemoglobin A1c is currently improved at 6 3% and he requires adjustment of diabetic medications

## 2020-09-01 NOTE — PROGRESS NOTES
Cardiology Progress Note - Augusto Edmonds 69 y o  male MRN: [id number]    Unit/Bed#: -01 Encounter: [id number]      Assessment and Plan  Assessment/ Plan: 1  Hypoglycemia  Patient presents with severe hypolycemia, altered mental status, is return to normal mentation after D10 infusion  Reports of diarrhea 8-10 episodes daily  No further episodes of diarrhea since admission  On home regimen of insulin, glipizide and sitagliptin, metformin  Management as primary       2  Atypical chest pain  The patient reports of chest pain substernal region, nonradiating associated with shortness of breath  Currently patient is chest pain free  Troponins trend-0 02>0 18>0 18  EKG reviewed-nonspecific T-wave inversions in AVF and lead 3  No ST segment changes in other  leads  Patient underwent elective left heart catheterization on 08/05/2020-patent LIMA-LAD, patent SVG-om, occluded SVG-RCA, intervention not performed  Given occluded graft, patient is not amenable for any intervention  Advised medical optimization  Will increase dose of Imdur to 60 mg daily  Continue dual antiplatelets-aspirin and prasugrel  Continue statin, beta blockers and Ace inhibitors  Patient strictly advised to refrain from smoking  Advised to follow-up with outpatient cardiologist within 1 week      3  Diarrhea  Reported multiple episodes of diarrhea since the beginning of the month  Patient is being tested for C diff  No further episodes of diarrhea  Management as per primary     4  PAD  Patient reports of gluteal pain on ambulation  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  Advised vascular surgery follow up on discharge     5  CKD  Creatinine stable      Subjective:   Patient seen and examined  No significant events overnight  No complaints of chest pain, shortness of breath, palpitations    Telemetry reviewed-within normal limits    Objective:     Vitals: Blood pressure 148/57, pulse 62, temperature 98 7 °F (37 1 °C), resp  rate 18, height 5' 8" (1 727 m), weight 70 kg (154 lb 5 2 oz), SpO2 97 %  , Body mass index is 23 46 kg/m² ,   Orthostatic Blood Pressures      Most Recent Value   Blood Pressure  148/57 filed at 09/01/2020 0714   Patient Position - Orthostatic VS  Sitting filed at 08/31/2020 0800            Intake/Output Summary (Last 24 hours) at 9/1/2020 1033  Last data filed at 9/1/2020 1030  Gross per 24 hour   Intake 2700 ml   Output 475 ml   Net 2225 ml         Physical Exam:    GEN: Augusto Edmonds appears well, alert and oriented x 3, pleasant and cooperative   HEENT: pupils equal, round, and reactive to light; extraocular muscles intact  NECK: supple, no carotid bruits   HEART: regular rhythm, normal S1 and S2, no murmurs, clicks, gallops or rubs   LUNGS: clear to auscultation bilaterally; no wheezes, rales, or rhonchi   ABDOMEN: normal bowel sounds, soft, no tenderness, no distention  EXTREMITIES: peripheral pulses normal; no clubbing, cyanosis, or edema      Medications:      Current Facility-Administered Medications:     acetaminophen (TYLENOL) tablet 650 mg, 650 mg, Oral, Q4H PRN, Bassam Lockett PA-C    aluminum-magnesium hydroxide-simethicone (MYLANTA) 200-200-20 mg/5 mL oral suspension 30 mL, 30 mL, Oral, Q6H PRN, Felicitas Saenz PA-C    aspirin chewable tablet 81 mg, 81 mg, Oral, Daily, Bassam Lockett PA-C, 81 mg at 09/01/20 0679    atorvastatin (LIPITOR) tablet 20 mg, 20 mg, Oral, Daily, Bassam Lockett PA-C, 20 mg at 09/01/20 8039    ceftriaxone (ROCEPHIN) 1 g/50 mL in dextrose IVPB, 1,000 mg, Intravenous, Q24H, Bassam Lockett PA-C, Last Rate: 100 mL/hr at 09/01/20 0924, 1,000 mg at 09/01/20 6020    cyanocobalamin (VITAMIN B-12) tablet 1,000 mcg, 1,000 mcg, Oral, Daily, Bassam Lockett PA-C, 1,000 mcg at 09/01/20 4902    famotidine (PEPCID) tablet 20 mg, 20 mg, Oral, Daily, Bassam Lockett PA-C, 20 mg at 11/99/71 0031    folic acid (FOLVITE) tablet 1 mg, 1 mg, Oral, Daily, Vianney Carlos Asim Ventura PA-C, 1 mg at 09/01/20 6324    furosemide (LASIX) tablet 10 mg, 10 mg, Oral, Daily, Bassam Lockett PA-C, 10 mg at 09/01/20 1441    gabapentin (NEURONTIN) capsule 100 mg, 100 mg, Oral, BID, Bassam Lockett PA-C, 100 mg at 09/01/20 2985    heparin (porcine) subcutaneous injection 5,000 Units, 5,000 Units, Subcutaneous, Q8H [address], 5,000 Units at 09/01/20 0645 **AND** [COMPLETED] Platelet count, , , Once, Moisés Dewitt PA-C    insulin glargine (LANTUS) subcutaneous injection 15 Units 0 15 mL, 15 Units, Subcutaneous, HS, Karri CARLIN MD, 15 Units at 08/31/20 2116    insulin lispro (HumaLOG) 100 units/mL subcutaneous injection 1-5 Units, 1-5 Units, Subcutaneous, TID AC, 2 Units at 08/31/20 1703 **AND** Fingerstick Glucose (POCT), , , TID AC, Bassam Lockett PA-C    insulin lispro (HumaLOG) 100 units/mL subcutaneous injection 1-5 Units, 1-5 Units, Subcutaneous, HS, Bassam Lockett PA-C, 2 Units at 08/31/20 2117    [START ON 9/2/2020] isosorbide dinitrate (ISORDIL) tablet 60 mg, 60 mg, Oral, Daily, Ponce Nogueira MD    lisinopril (ZESTRIL) tablet 20 mg, 20 mg, Oral, Daily, Quinton Lockett PA-C, 20 mg at 09/01/20 1821    metoprolol succinate (TOPROL-XL) 24 hr tablet 25 mg, 25 mg, Oral, Daily, Quinton Lockett PA-C, 25 mg at 09/01/20 4691    nicotine (NICODERM CQ) 7 mg/24hr TD 24 hr patch 1 patch, 1 patch, Transdermal, Daily, Bassam Lockett PA-C    ondansetron [facility]) injection 4 mg, 4 mg, Intravenous, Q6H PRN, Moisés Dewitt PA-C    prasugrel (EFFIENT) tablet 10 mg, 10 mg, Oral, Daily, Bassam Lockett PA-C, 10 mg at 09/01/20 3360    sodium chloride 0 9 % bolus 1,000 mL, 1,000 mL, Intravenous, Once, Moisés Dewitt PA-C     Labs & Results:    Results from last 7 days   Lab Units 08/31/20  1657 08/31/20  1318 08/31/20  0421   TROPONIN I ng/mL 0 18* 0 18* 0 02     Results from last 7 days   Lab Units 08/31/20  1027 08/31/20  0421   WBC Thousand/uL  --  9 92   HEMOGLOBIN g/dL  --  9 9* HEMATOCRIT %  --  32 4*   PLATELETS Thousands/uL 173 195     Results from last 7 days   Lab Units 09/01/20  0434   TRIGLYCERIDES mg/dL 110   HDL mg/dL 14*     Results from last 7 days   Lab Units 09/01/20  0434 08/31/20  0421   POTASSIUM mmol/L 4 3 5 0   CHLORIDE mmol/L 104 99*   CO2 mmol/L 26 27   BUN mg/dL 14 19   CREATININE mg/dL 1 35* 1 45*   CALCIUM mg/dL 8 7 8 9   ALK PHOS U/L  --  71   ALT U/L  --  13   AST U/L  --  15

## 2020-09-01 NOTE — PLAN OF CARE
Problem: Potential for Falls  Goal: Patient will remain free of falls  Description: INTERVENTIONS:  - Assess patient frequently for physical needs  -  Identify cognitive and physical deficits and behaviors that affect risk of falls    -  [place] fall precautions as indicated by assessment   - Educate patient/family on patient safety including physical limitations  - Instruct patient to call for assistance with activity based on assessment  - Modify environment to reduce risk of injury  - Consider OT/PT consult to assist with strengthening/mobility  Outcome: Progressing     Problem: PAIN - ADULT  Goal: Verbalizes/displays adequate comfort level or baseline comfort level  Description: Interventions:  - Encourage patient to monitor pain and request assistance  - Assess pain using appropriate pain scale  - Administer analgesics based on type and severity of pain and evaluate response  - Implement non-pharmacological measures as appropriate and evaluate response  - Consider cultural and social influences on pain and pain management  - Notify physician/advanced practitioner if interventions unsuccessful or patient reports new pain  Outcome: Progressing     Problem: INFECTION - ADULT  Goal: Absence or prevention of progression during hospitalization  Description: INTERVENTIONS:  - Assess and monitor for signs and symptoms of infection  - Monitor lab/diagnostic results  - Monitor all insertion sites, i e  indwelling lines, tubes, and drains  - Monitor endotracheal if appropriate and nasal secretions for changes in amount and color  - [place] appropriate cooling/warming therapies per order  - Administer medications as ordered  - Instruct and encourage patient and family to use good hand hygiene technique  - Identify and instruct in appropriate isolation precautions for identified infection/condition  Outcome: Progressing  Goal: Absence of fever/infection during neutropenic period  Description: INTERVENTIONS:  - Monitor WBC    Outcome: Progressing     Problem: SAFETY ADULT  Goal: Patient will remain free of falls  Description: INTERVENTIONS:  - Assess patient frequently for physical needs  -  Identify cognitive and physical deficits and behaviors that affect risk of falls    -  [place] fall precautions as indicated by assessment   - Educate patient/family on patient safety including physical limitations  - Instruct patient to call for assistance with activity based on assessment  - Modify environment to reduce risk of injury  - Consider OT/PT consult to assist with strengthening/mobility  Outcome: Progressing  Goal: Maintain or return to baseline ADL function  Description: INTERVENTIONS:  -  Assess patient's ability to carry out ADLs; assess patient's baseline for ADL function and identify physical deficits which impact ability to perform ADLs (bathing, care of mouth/teeth, toileting, grooming, dressing, etc )  - Assess/evaluate cause of self-care deficits   - Assess range of motion  - Assess patient's mobility; develop plan if impaired  - Assess patient's need for assistive devices and provide as appropriate  - Encourage maximum independence but intervene and supervise when necessary  - Involve family in performance of ADLs  - Assess for home care needs following discharge   - Consider OT consult to assist with ADL evaluation and planning for discharge  - Provide patient education as appropriate  Outcome: Progressing  Goal: Maintain or return mobility status to optimal level  Description: INTERVENTIONS:  - Assess patient's baseline mobility status (ambulation, transfers, stairs, etc )    - Identify cognitive and physical deficits and behaviors that affect mobility  - Identify mobility aids required to assist with transfers and/or ambulation (gait belt, sit-to-stand, lift, walker, cane, etc )  - [place] fall precautions as indicated by assessment  - Record patient progress and toleration of activity level on Mobility SBAR; progress patient to next Phase/Stage  - Instruct patient to call for assistance with activity based on assessment  - Consider rehabilitation consult to assist with strengthening/weightbearing, etc   Outcome: Progressing     Problem: DISCHARGE PLANNING  Goal: Discharge to home or other facility with appropriate resources  Description: INTERVENTIONS:  - Identify barriers to discharge w/patient and caregiver  - Arrange for needed discharge resources and transportation as appropriate  - Identify discharge learning needs (meds, wound care, etc )  - Arrange for interpretive services to assist at discharge as needed  - Refer to Case Management Department for coordinating discharge planning if the patient needs post-hospital services based on physician/advanced practitioner order or complex needs related to functional status, cognitive ability, or social support system  Outcome: Progressing     Problem: Knowledge Deficit  Goal: Patient/family/caregiver demonstrates understanding of disease process, treatment plan, medications, and discharge instructions  Description: Complete learning assessment and assess knowledge base    Interventions:  - Provide teaching at level of understanding  - Provide teaching via preferred learning methods  Outcome: Progressing

## 2020-09-01 NOTE — ASSESSMENT & PLAN NOTE
· UA obtained in ED revealed Lg blood, small leukocytes, 30-50 RBC, 10-20 WBC and occasional bacteria   Denies any current symptoms  · Unfortunately, urine culture is not available at this time, however, I would treat this patient due to abnormal UA and history of diabetes  · We will provide with course of Keflex upon discharge

## 2020-09-01 NOTE — ASSESSMENT & PLAN NOTE
· Reports was experiencing chest pressure this am after receiving D10 and regaining consciousness   States chest pressure is similar to discomfort during previous MI  · S/P CABG  · Per records underwent cardiac cath on 8/5/20 at [facility] which reveals severe multivessel disease  · 12 lead EKG obtained in ED revealed new T wave inversion in inferior leads III and aVF  · Noted slight increase in troponin at 0 18  ·  JACKIE = 4  · Cardiology has seen the patient and they do not recommend any acute interventions at this time  · Plan for to provide with ice a drill for chest discomfort  · Continue home dose ASA, Lipitor, Isordil, Vasotec (equivalent)Toprol XL and Effient

## 2020-09-01 NOTE — NURSING NOTE
Pt discharged to home with all belongings  AVS and follow-ups reviewed  Pt and son stated understanding  All questions answered at this time

## 2020-09-01 NOTE — UTILIZATION REVIEW
Initial Clinical Review    Admission: Date/Time/Statement:   Admission Orders (From admission, onward)     Ordered        08/31/20 0538  Place in Observation  Once                   Orders Placed This Encounter   Procedures    Place in Observation     Standing Status:   Standing     Number of Occurrences:   1     Order Specific Question:   Admitting Physician     Answer:   Blanche Marie [[id number]]     Order Specific Question:   Level of Care     Answer:   Med Surg [[id number]]     ED Arrival Information     Expected Arrival Acuity Means of Arrival Escorted By Service Admission Type    - 8/31/2020 03:52 [facility]) General Medicine Urgent    Arrival Complaint    low blood sugar        Chief Complaint   Patient presents with    Hypoglycemia - Symptomatic     per EMS BG of 28 and c/o chest pressure, given 324mg ASA, 0 4 nitro, and D10      Assessment/Plan: 69year old male to the ED from home with complaints of found unresponsive with blood sugar 28, upon waking complains of chest pain  H/O CABG, htn, HLD, diabetes, CEA, CKD  Admitted under observation for hypoglycemia, chest pain, acute cystitis  Found by his wife  GIven D10 by EMS and with improved mentation  He arrives to the ED with GCS=15  Started on D5NS  Check glucose every 2 hours  EKG shows new T wave inversions  INitial troponin neg, continue to trend  Cardiology consult  Continue ASA, Lipitor, Isordil, Vasotec (equivalent)Toprol XL and Effient  UA concerning for infection  IV abx started  Urine cultures pending  Having 8-10 episodes of diarrhea daily for which he is being seen as outpatient  8/31 Cardiology consult: Currently denies chest pain or shortness of breath  Exercise tolerance limited due to having diarrhea for which he is being worked up as outpatient  Peak troponin 0 18  Mildly elevated creat  Increase Imdur   Patient underwent elective left heart catheterization on 08/05/2020-patent LIMA-LAD, patent SVG-om, at occluded SVG-RCA  Continue tele    ED Triage Vitals   Temperature Pulse Respirations Blood Pressure SpO2   08/31/20 0404 08/31/20 0404 08/31/20 0404 08/31/20 0404 08/31/20 0404   98 2 °F (36 8 °C) 86 18 (!) 184/79 99 %      Temp Source Heart Rate Source Patient Position - Orthostatic VS BP Location FiO2 (%)   08/31/20 0404 08/31/20 0404 08/31/20 0404 08/31/20 0404 --   Oral Monitor Sitting Right arm       Pain Score       08/31/20 1340       2          Wt Readings from Last 1 Encounters:   08/31/20 70 kg (154 lb 5 2 oz)     Additional Vital Signs:   Date/Time   Temp  Pulse   Resp   BP   MAP (mmHg)   SpO2  O2 Device  Patient Position - Orthostatic VS    09/01/20 07:14:58   98 7 °F (37 1 °C)  62   18   148/57   87   97 %  --  --    08/31/20 23:07:01   99 7 °F (37 6 °C)  77   16   155/63   94   97 %  --  --    08/31/20 19:38:39   99 °F (37 2 °C)  77   17   152/54   87   97 %  --  --    08/31/20 19:36:20   99 °F (37 2 °C)  75   17   152/54   87   99 %  --  --    08/31/20 16:20:04   98 3 °F (36 8 °C)  69   16   145/53   84   98 %  --  --    08/31/20 09:32:53   98 5 °F (36 9 °C)  71   18   163/71   102   98 %  --  --    08/31/20 0800   --  68   16   158/70   101   100 %  None (Room air)  Sitting    08/31/20 0730   --  71   18   142/67   96   99 %  None (Room air)  Sitting    08/31/20 0530   --  72   17   120/58   83   98 %  None (Room air)  Sitting    08/31/20 0430   --  79   18   128/55   82   98 %  None (Room air)  Sitting    08/31/20 0404   98 2 °F (36 8 °C)  86   18   184/79Abnormal             Pertinent Labs/Diagnostic Test Results:   8/31 CXR: No acute consolidation or congestion   Stable chest radiograph   8/31 EKG: Normal sinus rhythm  Rightward axis  Cannot rule out Inferior infarct , age undetermined  Cannot rule out Anterior infarct , age undetermined  Abnormal ECG  When compared with ECG of 04-FEB-2018 02:59,  Questionable change in initial forces of Septal leads  T wave inversion now evident in Inferior leads Results from last 7 days   Lab Units 08/31/20  1027 08/31/20  0421   WBC Thousand/uL  --  9 92   HEMOGLOBIN g/dL  --  9 9*   HEMATOCRIT %  --  32 4*   PLATELETS Thousands/uL 173 195   NEUTROS ABS Thousands/µL  --  7 78*         Results from last 7 days   Lab Units 09/01/20  0434 08/31/20  0421   SODIUM mmol/L 137 133*   POTASSIUM mmol/L 4 3 5 0   CHLORIDE mmol/L 104 99*   CO2 mmol/L 26 27   ANION GAP mmol/L 7 7   BUN mg/dL 14 19   CREATININE mg/dL 1 35* 1 45*   EGFR ml/min/1 73sq m 52 48   CALCIUM mg/dL 8 7 8 9     Results from last 7 days   Lab Units 08/31/20  0421   AST U/L 15   ALT U/L 13   ALK PHOS U/L 71   TOTAL PROTEIN g/dL 7 2   ALBUMIN g/dL 2 9*   TOTAL BILIRUBIN mg/dL 0 30     Results from last 7 days   Lab Units 09/01/20  0619 08/31/20  2051 08/31/20  1644 08/31/20  1244 08/31/20  1041 08/31/20  0817 08/31/20  0707 08/31/20  0606 08/31/20  0358   POC GLUCOSE mg/dl 148* 223* 245* 165* 177* 163* 136 53* 111     Results from last 7 days   Lab Units 09/01/20  0434 08/31/20  0421   GLUCOSE RANDOM mg/dL 158* 101         Results from last 7 days   Lab Units 08/31/20  0421   HEMOGLOBIN A1C % 6 3*   EAG mg/dl 134       Results from last 7 days   Lab Units 08/31/20  1657 08/31/20  1318 08/31/20  0421   TROPONIN I ng/mL 0 18* 0 18* 0 02     ED Treatment:   Medication Administration from 08/31/2020 0352 to 08/31/2020 8204       Date/Time Order Dose Route Action     08/31/2020 0646 dextrose 50 % IV solution 25 mL 25 mL Intravenous Given     08/31/2020 0644 dextrose 5 % and sodium chloride 0 9 % infusion 75 mL/hr Intravenous New Bag        Past Medical History:   Diagnosis Date    Coronary artery disease     Diabetes mellitus ([facility] [address] )     Hyperlipidemia     Hypertension        Admitting Diagnosis: Hypoglycemia [E16 2]  Chest pressure [R07 89]  Age/Sex: 69 y o  male  Admission Orders:  UP and OOB  Tele  SCDS  C-diff, stool enteric bacterial panel    Scheduled Medications:  aspirin, 81 mg, Oral, Daily  atorvastatin, 20 mg, Oral, Daily  cefTRIAXone, 1,000 mg, Intravenous, Q24H  vitamin B-12, 1,000 mcg, Oral, Daily  famotidine, 20 mg, Oral, Daily  folic acid, 1 mg, Oral, Daily  furosemide, 10 mg, Oral, Daily  gabapentin, 100 mg, Oral, BID  heparin (porcine), 5,000 Units, Subcutaneous, Q8H ABBEY  insulin glargine, 15 Units, Subcutaneous, HS  insulin lispro, 1-5 Units, Subcutaneous, TID AC  insulin lispro, 1-5 Units, Subcutaneous, HS  isosorbide dinitrate, 30 mg, Oral, Daily  lisinopril, 20 mg, Oral, Daily  metoprolol succinate, 25 mg, Oral, Daily  nicotine, 1 patch, Transdermal, Daily  prasugrel, 10 mg, Oral, Daily  sodium chloride, 1,000 mL, Intravenous, Once      Continuous IV Infusions:  dextrose 5 % and sodium chloride 0 9 %, 75 mL/hr, Intravenous, Continuous      PRN Meds:  acetaminophen, 650 mg, Oral, Q4H PRN  aluminum-magnesium hydroxide-simethicone, 30 mL, Oral, Q6H PRN  ondansetron, 4 mg, Intravenous, Q6H PRN        IP CONSULT TO CARDIOLOGY    Network Utilization Review Department  [email or web address]  ATTENTION: Please call with any questions or concerns to [phone] and carefully listen to the prompts so that you are directed to the right person  All voicemails are confidential   Farrel Bodily all requests for admission clinical reviews, approved or denied determinations and any other requests to dedicated fax number below belonging to the campus where the patient is receiving treatment   List of dedicated fax numbers for the Facilities:  FACILITY NAME UR FAX NUMBER   ADMISSION DENIALS (Administrative/Medical Necessity) [phone]   [address]  (Maternity/NICU/Pediatrics) [phone]   Hilary Ham [phone]   Sofia Villalpando [phone]   Sangeeta Shepherd [phone]   Staci Montero [place] [address] [phone]     [facility] [phone]   [address] [place] [address] [phone]

## 2020-09-18 NOTE — ED PROVIDER NOTES
History  Chief Complaint   Patient presents with    Hypoglycemia - Symptomatic     per EMS BG of 28 and c/o chest pressure, given 324mg ASA, 0 4 nitro, and D8     76-year-old male presenting to the emergency department for evaluation of hypoglycemia  Per EMS, family called for 47 was having stroke, they found a blood sugar of 28  Patient is on oral hypoglycemics, to take that and long-acting insulin overnight  The he was given sugar, it is now feeling better  Prior to Admission Medications   Prescriptions Last Dose Informant Patient Reported?  Taking?   aspirin 81 mg chewable tablet   Yes Yes   Sig: Chew 81 mg daily   atorvastatin (LIPITOR) 20 mg tablet   Yes Yes   Sig: Take 20 mg by mouth daily   cyanocobalamin (VITAMIN B-12) 1,000 mcg tablet   Yes Yes   Sig: Take by mouth daily   enalapril (VASOTEC) 10 mg tablet   Yes Yes   Sig: Take 10 mg by mouth daily   famotidine (PEPCID) 20 mg tablet   Yes Yes   Sig: Take 20 mg by mouth daily   folic acid (FOLVITE) 1 mg tablet   Yes Yes   Sig: Take 1 mg by mouth daily    furosemide (LASIX) 20 mg tablet   Yes Yes   Sig: Take 10 mg by mouth daily   gabapentin (NEURONTIN) 300 mg capsule   Yes Yes   Sig: Take 100 mg by mouth 2 (two) times a day     glipiZIDE (GLUCOTROL) 5 mg tablet   Yes Yes   Sig: Take 5 mg by mouth 2 (two) times a day before meals   insulin glargine (LANTUS) 100 units/mL subcutaneous injection   Yes Yes   Sig: Inject 30 Units under the skin daily   isosorbide dinitrate (ISORDIL) 30 mg tablet   Yes Yes   Sig: Take 30 mg by mouth daily   metFORMIN (GLUCOPHAGE) 1000 MG tablet   Yes Yes   Sig: Take 1,000 mg by mouth 2 (two) times a day with meals   metoprolol succinate (TOPROL-XL) 25 mg 24 hr tablet   Yes Yes   Sig: Take 25 mg by mouth daily    prasugrel (EFFIENT) tablet   Yes Yes   Sig: Take 10 mg by mouth daily    sitaGLIPtin (JANUVIA) 100 mg tablet   Yes Yes   Sig: Take 100 mg by mouth daily      Facility-Administered Medications: None       Past Medical History:   Diagnosis Date    Coronary artery disease     Diabetes mellitus ([facility] [address] )     Hyperlipidemia     Hypertension        Past Surgical History:   Procedure Laterality Date    CORONARY ARTERY BYPASS GRAFT         Family History   Family history unknown: Yes     I have reviewed and agree with the history as documented  E-Cigarette/Vaping    E-Cigarette Use Never User      E-Cigarette/Vaping Substances    Nicotine No     THC No     CBD No     Flavoring No     Other No     Unknown No      Social History     Tobacco Use    Smoking status: Current Every Day Smoker     Packs/day: 0 25     Types: Cigarettes    Smokeless tobacco: Never Used    Tobacco comment: States quit 1 month ago   Substance Use Topics    Alcohol use: Never     Frequency: Never    Drug use: Never       Review of Systems   Constitutional: Negative for appetite change, chills, fatigue and fever  HENT: Negative for sneezing and sore throat  Eyes: Negative for visual disturbance  Respiratory: Negative for cough, choking, chest tightness, shortness of breath and wheezing  Cardiovascular: Negative for chest pain and palpitations  Gastrointestinal: Negative for abdominal pain, constipation, diarrhea, nausea and vomiting  Genitourinary: Negative for difficulty urinating and dysuria  Neurological: Negative for dizziness, weakness, light-headedness, numbness and headaches  Psychiatric/Behavioral: Positive for confusion  All other systems reviewed and are negative  Physical Exam  Physical Exam  Vitals signs and nursing note reviewed  Constitutional:       General: He is not in acute distress  Appearance: He is well-developed  He is not diaphoretic  HENT:      Head: Normocephalic and atraumatic  Eyes:      Pupils: Pupils are equal, round, and reactive to light  Neck:      Vascular: No JVD  Trachea: No tracheal deviation  Cardiovascular:      Rate and Rhythm: Normal rate and regular rhythm  Heart sounds: Normal heart sounds  No murmur  No friction rub  No gallop  Pulmonary:      Effort: Pulmonary effort is normal  No respiratory distress  Breath sounds: Normal breath sounds  No wheezing or rales  Abdominal:      General: Bowel sounds are normal  There is no distension  Palpations: Abdomen is soft  Tenderness: There is no abdominal tenderness  There is no guarding or rebound  Skin:     General: Skin is warm and dry  Coloration: Skin is not pale  Neurological:      Mental Status: He is alert and oriented to person, place, and time  Cranial Nerves: No cranial nerve deficit  Motor: No abnormal muscle tone     Psychiatric:         Behavior: Behavior normal          Vital Signs  ED Triage Vitals   Temperature Pulse Respirations Blood Pressure SpO2   08/31/20 0404 08/31/20 0404 08/31/20 0404 08/31/20 0404 08/31/20 0404   98 2 °F (36 8 °C) 86 18 (!) 184/79 99 %      Temp Source Heart Rate Source Patient Position - Orthostatic VS BP Location FiO2 (%)   08/31/20 0404 08/31/20 0404 08/31/20 0404 08/31/20 0404 --   Oral Monitor Sitting Right arm       Pain Score       08/31/20 1340       2           Vitals:    08/31/20 1938 08/31/20 2307 09/01/20 0714 09/01/20 1619   BP: 152/54 155/63 148/57 163/63   Pulse: 77 77 62 66   Patient Position - Orthostatic VS:             Visual Acuity      ED Medications  Medications   dextrose 50 % IV solution 25 mL (25 mL Intravenous Given 8/31/20 0646)       Diagnostic Studies  Results Reviewed     Procedure Component Value Units Date/Time    Lipid Panel with Direct LDL reflex [[id number]]  (Abnormal) Collected:  09/01/20 0434    Lab Status:  Final result Specimen:  Blood from Hand, Right Updated:  09/01/20 0507     Cholesterol 53 mg/dL      Triglycerides 110 mg/dL      HDL, Direct 14 mg/dL      LDL Calculated 17 mg/dL     Hemoglobin A1c w/EAG Estimation (Orders if not completed within the last 90 days) [[id number]]  (Abnormal) Collected: 08/31/20 0421    Lab Status:  Final result Specimen:  Blood from Arm, Right Updated:  08/31/20 1931     Hemoglobin A1C 6 3 %       mg/dl     Platelet count [[id number]]  (Normal) Collected:  08/31/20 1027    Lab Status:  Final result Specimen:  Blood from Arm, Left Updated:  08/31/20 1036     Platelets 448 Thousands/uL      MPV 11 4 fL     Fingerstick Glucose (POCT) [[id number]]  (Abnormal) Collected:  08/31/20 0817    Lab Status:  Final result Updated:  08/31/20 0818     POC Glucose 163 mg/dl     Fingerstick Glucose (POCT) [[id number]]  (Normal) Collected:  08/31/20 0707    Lab Status:  Final result Updated:  08/31/20 0709     POC Glucose 136 mg/dl     Fingerstick Glucose (POCT) [[id number]]  (Abnormal) Collected:  08/31/20 0606    Lab Status:  Final result Updated:  08/31/20 0607     POC Glucose 53 mg/dl     Comprehensive metabolic panel [[id number]]  (Abnormal) Collected:  08/31/20 0421    Lab Status:  Final result Specimen:  Blood from Arm, Right Updated:  08/31/20 0459     Sodium 133 mmol/L      Potassium 5 0 mmol/L      Chloride 99 mmol/L      CO2 27 mmol/L      ANION GAP 7 mmol/L      BUN 19 mg/dL      Creatinine 1 45 mg/dL      Glucose 101 mg/dL      Calcium 8 9 mg/dL      AST 15 U/L      ALT 13 U/L      Alkaline Phosphatase 71 U/L      Total Protein 7 2 g/dL      Albumin 2 9 g/dL      Total Bilirubin 0 30 mg/dL      eGFR 48 ml/min/1 73sq m     Narrative:       Soni guidelines for Chronic Kidney Disease (CKD):     Stage 1 with normal or high GFR (GFR > 90 mL/min/1 73 square meters)    Stage 2 Mild CKD (GFR = 60-89 mL/min/1 73 square meters)    Stage 3A Moderate CKD (GFR = 45-59 mL/min/1 73 square meters)    Stage 3B Moderate CKD (GFR = 30-44 mL/min/1 73 square meters)    Stage 4 Severe CKD (GFR = 15-29 mL/min/1 73 square meters)    Stage 5 End Stage CKD (GFR <15 mL/min/1 73 square meters)  Note: GFR calculation is accurate only with a steady state creatinine    Troponin I [[id number]]  (Normal) Collected:  08/31/20 0421    Lab Status:  Final result Specimen:  Blood from Arm, Right Updated:  08/31/20 0449     Troponin I 0 02 ng/mL     CBC and differential [[id number]]  (Abnormal) Collected:  08/31/20 0421    Lab Status:  Final result Specimen:  Blood from Arm, Right Updated:  08/31/20 0426     WBC 9 92 Thousand/uL      RBC 3 72 Million/uL      Hemoglobin 9 9 g/dL      Hematocrit 32 4 %      MCV 87 fL      MCH 26 6 pg      MCHC 30 6 g/dL      RDW 14 4 %      MPV 10 7 fL      Platelets 490 Thousands/uL      nRBC 0 /100 WBCs      Neutrophils Relative 78 %      Immat GRANS % 1 %      Lymphocytes Relative 14 %      Monocytes Relative 6 %      Eosinophils Relative 1 %      Basophils Relative 0 %      Neutrophils Absolute 7 78 Thousands/µL      Immature Grans Absolute 0 06 Thousand/uL      Lymphocytes Absolute 1 36 Thousands/µL      Monocytes Absolute 0 60 Thousand/µL      Eosinophils Absolute 0 09 Thousand/µL      Basophils Absolute 0 03 Thousands/µL     Stool Enteric Bacterial Panel by PCR [[id number]]     Lab Status:  No result Specimen:  Stool     Fingerstick Glucose (POCT) [[id number]]  (Normal) Collected:  08/31/20 0358    Lab Status:  Final result Updated:  08/31/20 0404     POC Glucose 111 mg/dl                  XR chest portable   Final Result by Nhung Reeder MD (08/31 9039)      No acute consolidation or congestion   Stable chest radiograph         Workstation performed: [id number]                    Procedures  Procedures         ED Course             Identification of Seniors at Risk      Most Recent Value   (ISAR) Identification of Seniors at Risk   Before the illness or injury that brought you to the Emergency, did you need someone to help you on a regular basis? 0 Filed at: 08/31/2020 0408   In the last 24 hours, have you needed more help than usual?  0 Filed at: 08/31/2020 5667   Have you been hospitalized for one or more nights during the past 6 months?   1 Filed at: 08/31/2020 0408   In general, do you see well?  0 Filed at: 08/31/2020 0408   In general, do you have serious problems with your memory? 0 Filed at: 08/31/2020 0408   Do you take more than three different medications every day? 1 Filed at: 08/31/2020 0408   ISAR Score  2 Filed at: 08/31/2020 0408                    SBIRT 22yo+      Most Recent Value   Initial Alcohol Screen: US AUDIT-C    1  How often do you have a drink containing alcohol?  0 Filed at: 08/31/2020 0425   2  How many drinks containing alcohol do you have on a typical day you are drinking? 0 Filed at: 08/31/2020 0425   3a  Male UNDER 65: How often do you have five or more drinks on one occasion? 0 Filed at: 08/31/2020 0425   3b  FEMALE Any Age, or MALE 65+: How often do you have 4 or more drinks on one occassion? 0 Filed at: 08/31/2020 0425   Audit-C Score  0 Filed at: 08/31/2020 0425   JUAN: How many times in the past year have you    Used an illegal drug or used a prescription medication for non-medical reasons?   Never Filed at: 08/31/2020 0425        JACKIE Risk Score      Most Recent Value   Age >= 72  1 Filed at: 08/31/2020 0602   Known CAD (stenosis >= 50%)  1 Filed at: 08/31/2020 0602   Recent (<=24 hrs) Service Angina  0 Filed at: 08/31/2020 0602   ST Deviation >= 0 5 mm  0 Filed at: 08/31/2020 0602   3+ CAD Risk Factors (FHx, HTN, HLP, DM, Smoker)  1 Filed at: 08/31/2020 0602   Aspirin Use Past 7 Days  1 Filed at: 08/31/2020 0602   Elevated Cardiac Markers  0 Filed at: 08/31/2020 0602   JACKIE Risk Score (Calculated)  4 Filed at: 08/31/2020 0602                [facility]  Number of Diagnoses or Management Options  Hypoglycemia:   Diagnosis management comments: 65-year-old male with hypoglycemia, not better though recommended admission given the fact that he is on oral long-acting hypoglycemic      Disposition  Final diagnoses:   Hypoglycemia     Time reflects when diagnosis was documented in both MDM as applicable and the Disposition within this note     Time User Action Codes Description Comment    8/31/2020  5:37 AM Vidya Grullon Add [E16 2] Hypoglycemia     8/31/2020  6:08 AM Franklyn CHING Add [R07 89] Chest pressure     9/1/2020 10:02 AM Girtha Mingle Add [E11 9] Diabetes ([address] )     9/1/2020 10:04 AM Girtha Mingle Add [J18 9] CAP (community acquired pneumonia)       ED Disposition     ED Disposition Condition Date/Time Comment    Admit Stable Mon Aug 31, 2020  5:37 AM Case was discussed with VIDYA and the patient's admission status was agreed to be Admission Status: observation status to the service of Dr Abner Schumacher           Follow-up Information     Follow up With Specialties Details Why Contact Info    Judy Florez  Call in 1 week(s)  [address] [employer]  [phone]            Discharge Medication List as of 9/1/2020  4:45 PM      START taking these medications    Details   cephalexin (KEFLEX) 500 mg capsule Take 1 capsule (500 mg total) by mouth every 12 (twelve) hours for 3 days, Starting Tue 9/1/2020, Until Fri 9/4/2020, Normal         CONTINUE these medications which have CHANGED    Details   insulin glargine (LANTUS) 100 units/mL subcutaneous injection Inject 15 Units under the skin daily, Starting Tue 9/1/2020, No Print         CONTINUE these medications which have NOT CHANGED    Details   aspirin 81 mg chewable tablet Chew 81 mg daily, Historical Med      atorvastatin (LIPITOR) 20 mg tablet Take 20 mg by mouth daily, Historical Med      cyanocobalamin (VITAMIN B-12) 1,000 mcg tablet Take by mouth daily, Historical Med      enalapril (VASOTEC) 10 mg tablet Take 10 mg by mouth daily, Historical Med      famotidine (PEPCID) 20 mg tablet Take 20 mg by mouth daily, Historical Med      folic acid (FOLVITE) 1 mg tablet Take 1 mg by mouth daily , Historical Med      furosemide (LASIX) 20 mg tablet Take 10 mg by mouth daily, Historical Med      gabapentin (NEURONTIN) 300 mg capsule Take 100 mg by mouth 2 (two) times a day  , Historical Med      isosorbide dinitrate (ISORDIL) 30 mg tablet Take 30 mg by mouth daily, Historical Med      metFORMIN (GLUCOPHAGE) 1000 MG tablet Take 1,000 mg by mouth 2 (two) times a day with meals, Historical Med      metoprolol succinate (TOPROL-XL) 25 mg 24 hr tablet Take 25 mg by mouth daily , Historical Med      prasugrel (EFFIENT) tablet Take 10 mg by mouth daily , Historical Med      sitaGLIPtin (JANUVIA) 100 mg tablet Take 100 mg by mouth daily, Historical Med         STOP taking these medications       glipiZIDE (GLUCOTROL) 5 mg tablet Comments:   Reason for Stopping:             Outpatient Discharge Orders   Discharge Diet     Activity as tolerated       PDMP Review     None          ED Provider  Electronically Signed by           Vianca Mack MD  09/17/20 7529

## 2021-10-16 ENCOUNTER — HOSPITAL ENCOUNTER (EMERGENCY)
Facility: HOSPITAL | Age: 71
Discharge: HOME | End: 2021-10-17
Attending: EMERGENCY MEDICINE | Admitting: EMERGENCY MEDICINE
Payer: COMMERCIAL

## 2021-10-16 ENCOUNTER — APPOINTMENT (EMERGENCY)
Dept: RADIOLOGY | Facility: HOSPITAL | Age: 71
End: 2021-10-16
Payer: COMMERCIAL

## 2021-10-16 DIAGNOSIS — R50.9 FEVER: ICD-10-CM

## 2021-10-16 DIAGNOSIS — N39.0 UTI (URINARY TRACT INFECTION): ICD-10-CM

## 2021-10-16 DIAGNOSIS — B34.9 ACUTE VIRAL SYNDROME: Primary | ICD-10-CM

## 2021-10-16 DIAGNOSIS — Z20.822 ENCOUNTER FOR LABORATORY TESTING FOR COVID-19 VIRUS: ICD-10-CM

## 2021-10-16 LAB
ALBUMIN SERPL BCP-MCNC: 2.8 G/DL (ref 3.5–5)
ALP SERPL-CCNC: 81 U/L (ref 46–116)
ALT SERPL W P-5'-P-CCNC: 10 U/L (ref 12–78)
ANION GAP SERPL CALCULATED.3IONS-SCNC: 9 MMOL/L (ref 4–13)
APTT PPP: 40 SECONDS (ref 23–37)
AST SERPL W P-5'-P-CCNC: 16 U/L (ref 5–45)
BASE EX.OXY STD BLDV CALC-SCNC: 62.9 % (ref 60–80)
BASE EXCESS BLDV CALC-SCNC: -0.7 MMOL/L
BASOPHILS # BLD AUTO: 0.01 THOUSANDS/ΜL (ref 0–0.1)
BASOPHILS NFR BLD AUTO: 0 % (ref 0–1)
BILIRUB SERPL-MCNC: 0.86 MG/DL (ref 0.2–1)
BUN SERPL-MCNC: 25 MG/DL (ref 5–25)
CALCIUM ALBUM COR SERPL-MCNC: 9.8 MG/DL (ref 8.3–10.1)
CALCIUM SERPL-MCNC: 8.8 MG/DL (ref 8.3–10.1)
CHLORIDE SERPL-SCNC: 99 MMOL/L (ref 100–108)
CO2 SERPL-SCNC: 26 MMOL/L (ref 21–32)
CREAT SERPL-MCNC: 1.48 MG/DL (ref 0.6–1.3)
EOSINOPHIL # BLD AUTO: 0 THOUSAND/ΜL (ref 0–0.61)
EOSINOPHIL NFR BLD AUTO: 0 % (ref 0–6)
ERYTHROCYTE [DISTWIDTH] IN BLOOD BY AUTOMATED COUNT: 13.6 % (ref 11.6–15.1)
FLUAV RNA RESP QL NAA+PROBE: NEGATIVE
FLUBV RNA RESP QL NAA+PROBE: NEGATIVE
GFR SERPL CREATININE-BSD FRML MDRD: 47 ML/MIN/1.73SQ M
GLUCOSE SERPL-MCNC: 253 MG/DL (ref 65–140)
HCO3 BLDV-SCNC: 23.3 MMOL/L (ref 24–30)
HCT VFR BLD AUTO: 30.6 % (ref 36.5–49.3)
HGB BLD-MCNC: 9.6 G/DL (ref 12–17)
IMM GRANULOCYTES # BLD AUTO: 0.08 THOUSAND/UL (ref 0–0.2)
IMM GRANULOCYTES NFR BLD AUTO: 1 % (ref 0–2)
INR PPP: 1.17 (ref 0.84–1.19)
LACTATE SERPL-SCNC: 1.6 MMOL/L (ref 0.5–2)
LYMPHOCYTES # BLD AUTO: 0.95 THOUSANDS/ΜL (ref 0.6–4.47)
LYMPHOCYTES NFR BLD AUTO: 10 % (ref 14–44)
MAGNESIUM SERPL-MCNC: 1.7 MG/DL (ref 1.6–2.6)
MCH RBC QN AUTO: 26.3 PG (ref 26.8–34.3)
MCHC RBC AUTO-ENTMCNC: 31.4 G/DL (ref 31.4–37.4)
MCV RBC AUTO: 84 FL (ref 82–98)
MONOCYTES # BLD AUTO: 0.7 THOUSAND/ΜL (ref 0.17–1.22)
MONOCYTES NFR BLD AUTO: 7 % (ref 4–12)
NEUTROPHILS # BLD AUTO: 7.66 THOUSANDS/ΜL (ref 1.85–7.62)
NEUTS SEG NFR BLD AUTO: 82 % (ref 43–75)
NRBC BLD AUTO-RTO: 0 /100 WBCS
O2 CT BLDV-SCNC: 9 ML/DL
PCO2 BLDV: 35.9 MM HG (ref 42–50)
PH BLDV: 7.43 [PH] (ref 7.3–7.4)
PLATELET # BLD AUTO: 125 THOUSANDS/UL (ref 149–390)
PMV BLD AUTO: 11.7 FL (ref 8.9–12.7)
PO2 BLDV: 31.8 MM HG (ref 35–45)
POTASSIUM SERPL-SCNC: 4.2 MMOL/L (ref 3.5–5.3)
PROT SERPL-MCNC: 7.1 G/DL (ref 6.4–8.2)
PROTHROMBIN TIME: 14.5 SECONDS (ref 11.6–14.5)
RBC # BLD AUTO: 3.65 MILLION/UL (ref 3.88–5.62)
RSV RNA RESP QL NAA+PROBE: NEGATIVE
SARS-COV-2 RNA RESP QL NAA+PROBE: NEGATIVE
SODIUM SERPL-SCNC: 134 MMOL/L (ref 136–145)
TROPONIN I SERPL-MCNC: 0.03 NG/ML
WBC # BLD AUTO: 9.4 THOUSAND/UL (ref 4.31–10.16)

## 2021-10-16 PROCEDURE — 84484 ASSAY OF TROPONIN QUANT: CPT | Performed by: EMERGENCY MEDICINE

## 2021-10-16 PROCEDURE — 87040 BLOOD CULTURE FOR BACTERIA: CPT | Performed by: EMERGENCY MEDICINE

## 2021-10-16 PROCEDURE — 85025 COMPLETE CBC W/AUTO DIFF WBC: CPT | Performed by: EMERGENCY MEDICINE

## 2021-10-16 PROCEDURE — 96367 TX/PROPH/DG ADDL SEQ IV INF: CPT

## 2021-10-16 PROCEDURE — 99285 EMERGENCY DEPT VISIT HI MDM: CPT

## 2021-10-16 PROCEDURE — 85730 THROMBOPLASTIN TIME PARTIAL: CPT | Performed by: EMERGENCY MEDICINE

## 2021-10-16 PROCEDURE — 71045 X-RAY EXAM CHEST 1 VIEW: CPT

## 2021-10-16 PROCEDURE — 0241U HB NFCT DS VIR RESP RNA 4 TRGT: CPT | Performed by: EMERGENCY MEDICINE

## 2021-10-16 PROCEDURE — 82805 BLOOD GASES W/O2 SATURATION: CPT | Performed by: EMERGENCY MEDICINE

## 2021-10-16 PROCEDURE — 83735 ASSAY OF MAGNESIUM: CPT | Performed by: EMERGENCY MEDICINE

## 2021-10-16 PROCEDURE — 85610 PROTHROMBIN TIME: CPT | Performed by: EMERGENCY MEDICINE

## 2021-10-16 PROCEDURE — 99284 EMERGENCY DEPT VISIT MOD MDM: CPT | Performed by: EMERGENCY MEDICINE

## 2021-10-16 PROCEDURE — 96366 THER/PROPH/DIAG IV INF ADDON: CPT

## 2021-10-16 PROCEDURE — 36415 COLL VENOUS BLD VENIPUNCTURE: CPT | Performed by: EMERGENCY MEDICINE

## 2021-10-16 PROCEDURE — 96375 TX/PRO/DX INJ NEW DRUG ADDON: CPT

## 2021-10-16 PROCEDURE — 93005 ELECTROCARDIOGRAM TRACING: CPT

## 2021-10-16 PROCEDURE — 80053 COMPREHEN METABOLIC PANEL: CPT | Performed by: EMERGENCY MEDICINE

## 2021-10-16 PROCEDURE — 83605 ASSAY OF LACTIC ACID: CPT | Performed by: EMERGENCY MEDICINE

## 2021-10-16 PROCEDURE — 96365 THER/PROPH/DIAG IV INF INIT: CPT

## 2021-10-16 PROCEDURE — 84145 PROCALCITONIN (PCT): CPT | Performed by: EMERGENCY MEDICINE

## 2021-10-16 RX ORDER — SODIUM CHLORIDE, SODIUM GLUCONATE, SODIUM ACETATE, POTASSIUM CHLORIDE, MAGNESIUM CHLORIDE, SODIUM PHOSPHATE, DIBASIC, AND POTASSIUM PHOSPHATE .53; .5; .37; .037; .03; .012; .00082 G/100ML; G/100ML; G/100ML; G/100ML; G/100ML; G/100ML; G/100ML
1000 INJECTION, SOLUTION INTRAVENOUS ONCE
Status: COMPLETED | OUTPATIENT
Start: 2021-10-16 | End: 2021-10-17

## 2021-10-16 RX ORDER — ONDANSETRON 2 MG/ML
4 INJECTION INTRAMUSCULAR; INTRAVENOUS ONCE
Status: COMPLETED | OUTPATIENT
Start: 2021-10-16 | End: 2021-10-16

## 2021-10-16 RX ORDER — ACETAMINOPHEN 325 MG/1
650 TABLET ORAL ONCE
Status: COMPLETED | OUTPATIENT
Start: 2021-10-16 | End: 2021-10-16

## 2021-10-16 RX ORDER — SODIUM CHLORIDE, SODIUM GLUCONATE, SODIUM ACETATE, POTASSIUM CHLORIDE, MAGNESIUM CHLORIDE, SODIUM PHOSPHATE, DIBASIC, AND POTASSIUM PHOSPHATE .53; .5; .37; .037; .03; .012; .00082 G/100ML; G/100ML; G/100ML; G/100ML; G/100ML; G/100ML; G/100ML
1000 INJECTION, SOLUTION INTRAVENOUS ONCE
Status: COMPLETED | OUTPATIENT
Start: 2021-10-16 | End: 2021-10-16

## 2021-10-16 RX ADMIN — SODIUM CHLORIDE, SODIUM GLUCONATE, SODIUM ACETATE, POTASSIUM CHLORIDE, MAGNESIUM CHLORIDE, SODIUM PHOSPHATE, DIBASIC, AND POTASSIUM PHOSPHATE 1000 ML: .53; .5; .37; .037; .03; .012; .00082 INJECTION, SOLUTION INTRAVENOUS at 23:26

## 2021-10-16 RX ADMIN — CEFTRIAXONE SODIUM 2000 MG: 10 INJECTION, POWDER, FOR SOLUTION INTRAVENOUS at 22:09

## 2021-10-16 RX ADMIN — ACETAMINOPHEN 650 MG: 325 TABLET, FILM COATED ORAL at 22:19

## 2021-10-16 RX ADMIN — SODIUM CHLORIDE, SODIUM GLUCONATE, SODIUM ACETATE, POTASSIUM CHLORIDE, MAGNESIUM CHLORIDE, SODIUM PHOSPHATE, DIBASIC, AND POTASSIUM PHOSPHATE 1000 ML: .53; .5; .37; .037; .03; .012; .00082 INJECTION, SOLUTION INTRAVENOUS at 22:45

## 2021-10-16 RX ADMIN — SODIUM CHLORIDE, SODIUM GLUCONATE, SODIUM ACETATE, POTASSIUM CHLORIDE, MAGNESIUM CHLORIDE, SODIUM PHOSPHATE, DIBASIC, AND POTASSIUM PHOSPHATE 1000 ML: .53; .5; .37; .037; .03; .012; .00082 INJECTION, SOLUTION INTRAVENOUS at 21:51

## 2021-10-16 RX ADMIN — ONDANSETRON 4 MG: 2 INJECTION INTRAMUSCULAR; INTRAVENOUS at 22:19

## 2021-10-17 ENCOUNTER — APPOINTMENT (EMERGENCY)
Dept: CT IMAGING | Facility: HOSPITAL | Age: 71
End: 2021-10-17
Payer: COMMERCIAL

## 2021-10-17 VITALS
DIASTOLIC BLOOD PRESSURE: 63 MMHG | SYSTOLIC BLOOD PRESSURE: 134 MMHG | RESPIRATION RATE: 18 BRPM | HEIGHT: 68 IN | OXYGEN SATURATION: 98 % | WEIGHT: 168 LBS | BODY MASS INDEX: 25.46 KG/M2 | HEART RATE: 62 BPM | TEMPERATURE: 99.9 F

## 2021-10-17 PROBLEM — N39.0 UTI (URINARY TRACT INFECTION): Status: ACTIVE | Noted: 2021-10-17

## 2021-10-17 PROBLEM — Z20.822 ENCOUNTER FOR LABORATORY TESTING FOR COVID-19 VIRUS: Status: ACTIVE | Noted: 2021-10-17

## 2021-10-17 PROBLEM — B34.9 ACUTE VIRAL SYNDROME: Status: ACTIVE | Noted: 2021-10-17

## 2021-10-17 PROBLEM — R50.9 FEVER: Status: ACTIVE | Noted: 2021-10-17

## 2021-10-17 LAB
ATRIAL RATE: 77 BPM
BACTERIA UR QL AUTO: ABNORMAL /HPF
BILIRUB UR QL STRIP: NEGATIVE
CLARITY UR: CLEAR
COARSE GRAN CASTS URNS QL MICRO: ABNORMAL /LPF
COLOR UR: YELLOW
GLUCOSE UR STRIP-MCNC: ABNORMAL MG/DL
HGB UR QL STRIP.AUTO: ABNORMAL
KETONES UR STRIP-MCNC: NEGATIVE MG/DL
LEUKOCYTE ESTERASE UR QL STRIP: NEGATIVE
NITRITE UR QL STRIP: NEGATIVE
NON-SQ EPI CELLS URNS QL MICRO: ABNORMAL /HPF
P AXIS: 29 DEGREES
PH UR STRIP.AUTO: 5 [PH]
PR INTERVAL: 148 MS
PROCALCITONIN SERPL-MCNC: 0.8 NG/ML
PROT UR STRIP-MCNC: ABNORMAL MG/DL
QRS AXIS: 48 DEGREES
QRSD INTERVAL: 84 MS
QT INTERVAL: 376 MS
QTC INTERVAL: 425 MS
RBC #/AREA URNS AUTO: ABNORMAL /HPF
SP GR UR STRIP.AUTO: 1.02 (ref 1–1.03)
T WAVE AXIS: 50 DEGREES
UROBILINOGEN UR QL STRIP.AUTO: 1 E.U./DL
VENTRICULAR RATE: 77 BPM
WBC #/AREA URNS AUTO: ABNORMAL /HPF

## 2021-10-17 PROCEDURE — 81001 URINALYSIS AUTO W/SCOPE: CPT | Performed by: EMERGENCY MEDICINE

## 2021-10-17 PROCEDURE — 71250 CT THORAX DX C-: CPT

## 2021-10-17 PROCEDURE — 93010 ELECTROCARDIOGRAM REPORT: CPT | Performed by: INTERNAL MEDICINE

## 2021-10-17 RX ORDER — CIPROFLOXACIN 250 MG/1
250 TABLET, FILM COATED ORAL EVERY 12 HOURS SCHEDULED
Qty: 14 TABLET | Refills: 0 | Status: SHIPPED | OUTPATIENT
Start: 2021-10-17 | End: 2021-10-24

## 2021-10-17 RX ORDER — CIPROFLOXACIN 500 MG/1
500 TABLET, FILM COATED ORAL ONCE
Status: COMPLETED | OUTPATIENT
Start: 2021-10-17 | End: 2021-10-17

## 2021-10-17 RX ADMIN — CIPROFLOXACIN HYDROCHLORIDE 500 MG: 500 TABLET, FILM COATED ORAL at 01:21

## 2021-10-22 LAB
BACTERIA BLD CULT: NORMAL
BACTERIA BLD CULT: NORMAL

## 2022-10-12 PROBLEM — R50.9 FEVER: Status: RESOLVED | Noted: 2021-10-17 | Resolved: 2022-10-12

## 2022-10-12 PROBLEM — N39.0 UTI (URINARY TRACT INFECTION): Status: RESOLVED | Noted: 2021-10-17 | Resolved: 2022-10-12

## 2024-02-21 PROBLEM — N30.00 ACUTE CYSTITIS WITHOUT HEMATURIA: Status: RESOLVED | Noted: 2020-08-31 | Resolved: 2024-02-21

## 2024-02-21 PROBLEM — J10.1 INFLUENZA A: Status: RESOLVED | Noted: 2018-02-05 | Resolved: 2024-02-21

## 2024-02-21 PROBLEM — J18.9 CAP (COMMUNITY ACQUIRED PNEUMONIA): Status: RESOLVED | Noted: 2018-02-03 | Resolved: 2024-02-21

## 2024-02-29 ENCOUNTER — APPOINTMENT (OUTPATIENT)
Dept: RADIOLOGY | Facility: HOSPITAL | Age: 74
End: 2024-02-29
Payer: MEDICAID

## 2024-02-29 ENCOUNTER — APPOINTMENT (EMERGENCY)
Dept: CT IMAGING | Facility: HOSPITAL | Age: 74
End: 2024-02-29
Payer: MEDICAID

## 2024-02-29 ENCOUNTER — HOSPITAL ENCOUNTER (EMERGENCY)
Facility: HOSPITAL | Age: 74
Discharge: HOME | End: 2024-03-01
Attending: EMERGENCY MEDICINE
Payer: MEDICAID

## 2024-02-29 DIAGNOSIS — R11.10 VOMITING: Primary | ICD-10-CM

## 2024-02-29 LAB
ALBUMIN SERPL BCP-MCNC: 4 G/DL (ref 3.5–5)
ALP SERPL-CCNC: 90 U/L (ref 34–104)
ALT SERPL W P-5'-P-CCNC: 5 U/L (ref 7–52)
ANION GAP SERPL CALCULATED.3IONS-SCNC: 11 MMOL/L
AST SERPL W P-5'-P-CCNC: 10 U/L (ref 13–39)
BASOPHILS # BLD AUTO: 0.01 THOUSANDS/ÂΜL (ref 0–0.1)
BASOPHILS NFR BLD AUTO: 0 % (ref 0–1)
BILIRUB SERPL-MCNC: 0.6 MG/DL (ref 0.2–1)
BUN SERPL-MCNC: 40 MG/DL (ref 5–25)
CALCIUM SERPL-MCNC: 9.8 MG/DL (ref 8.4–10.2)
CARDIAC TROPONIN I PNL SERPL HS: 6 NG/L
CHLORIDE SERPL-SCNC: 101 MMOL/L (ref 96–108)
CO2 SERPL-SCNC: 23 MMOL/L (ref 21–32)
CREAT SERPL-MCNC: 1.42 MG/DL (ref 0.6–1.3)
EOSINOPHIL # BLD AUTO: 0.05 THOUSAND/ÂΜL (ref 0–0.61)
EOSINOPHIL NFR BLD AUTO: 1 % (ref 0–6)
ERYTHROCYTE [DISTWIDTH] IN BLOOD BY AUTOMATED COUNT: 14.5 % (ref 11.6–15.1)
FLUAV RNA RESP QL NAA+PROBE: NEGATIVE
FLUBV RNA RESP QL NAA+PROBE: NEGATIVE
GFR SERPL CREATININE-BSD FRML MDRD: 47 ML/MIN/1.73SQ M
GLUCOSE SERPL-MCNC: 161 MG/DL (ref 65–140)
HCT VFR BLD AUTO: 33.6 % (ref 36.5–49.3)
HGB BLD-MCNC: 10.4 G/DL (ref 12–17)
IMM GRANULOCYTES # BLD AUTO: 0.06 THOUSAND/UL (ref 0–0.2)
IMM GRANULOCYTES NFR BLD AUTO: 1 % (ref 0–2)
LYMPHOCYTES # BLD AUTO: 1.12 THOUSANDS/ÂΜL (ref 0.6–4.47)
LYMPHOCYTES NFR BLD AUTO: 12 % (ref 14–44)
MCH RBC QN AUTO: 25.1 PG (ref 26.8–34.3)
MCHC RBC AUTO-ENTMCNC: 31 G/DL (ref 31.4–37.4)
MCV RBC AUTO: 81 FL (ref 82–98)
MONOCYTES # BLD AUTO: 0.65 THOUSAND/ÂΜL (ref 0.17–1.22)
MONOCYTES NFR BLD AUTO: 7 % (ref 4–12)
NEUTROPHILS # BLD AUTO: 7.85 THOUSANDS/ÂΜL (ref 1.85–7.62)
NEUTS SEG NFR BLD AUTO: 79 % (ref 43–75)
NRBC BLD AUTO-RTO: 0 /100 WBCS
PLATELET # BLD AUTO: 181 THOUSANDS/UL (ref 149–390)
PMV BLD AUTO: 11.1 FL (ref 8.9–12.7)
POTASSIUM SERPL-SCNC: 4.3 MMOL/L (ref 3.5–5.3)
PROT SERPL-MCNC: 8 G/DL (ref 6.4–8.4)
RBC # BLD AUTO: 4.15 MILLION/UL (ref 3.88–5.62)
RSV RNA RESP QL NAA+PROBE: NEGATIVE
SARS-COV-2 RNA RESP QL NAA+PROBE: NEGATIVE
SODIUM SERPL-SCNC: 135 MMOL/L (ref 135–147)
WBC # BLD AUTO: 9.74 THOUSAND/UL (ref 4.31–10.16)

## 2024-02-29 PROCEDURE — 0241U HB NFCT DS VIR RESP RNA 4 TRGT: CPT | Performed by: EMERGENCY MEDICINE

## 2024-02-29 PROCEDURE — 84484 ASSAY OF TROPONIN QUANT: CPT | Performed by: EMERGENCY MEDICINE

## 2024-02-29 PROCEDURE — 80053 COMPREHEN METABOLIC PANEL: CPT | Performed by: EMERGENCY MEDICINE

## 2024-02-29 PROCEDURE — 36415 COLL VENOUS BLD VENIPUNCTURE: CPT

## 2024-02-29 PROCEDURE — 74177 CT ABD & PELVIS W/CONTRAST: CPT

## 2024-02-29 PROCEDURE — 99285 EMERGENCY DEPT VISIT HI MDM: CPT

## 2024-02-29 PROCEDURE — 96374 THER/PROPH/DIAG INJ IV PUSH: CPT

## 2024-02-29 PROCEDURE — 96361 HYDRATE IV INFUSION ADD-ON: CPT

## 2024-02-29 PROCEDURE — 85025 COMPLETE CBC W/AUTO DIFF WBC: CPT | Performed by: EMERGENCY MEDICINE

## 2024-02-29 PROCEDURE — 71046 X-RAY EXAM CHEST 2 VIEWS: CPT

## 2024-02-29 PROCEDURE — 93005 ELECTROCARDIOGRAM TRACING: CPT

## 2024-02-29 RX ORDER — ONDANSETRON 2 MG/ML
4 INJECTION INTRAMUSCULAR; INTRAVENOUS ONCE
Status: COMPLETED | OUTPATIENT
Start: 2024-03-01 | End: 2024-02-29

## 2024-02-29 RX ADMIN — IOHEXOL 100 ML: 350 INJECTION, SOLUTION INTRAVENOUS at 22:16

## 2024-02-29 RX ADMIN — SODIUM CHLORIDE 500 ML: 0.9 INJECTION, SOLUTION INTRAVENOUS at 23:54

## 2024-02-29 RX ADMIN — ONDANSETRON 4 MG: 2 INJECTION INTRAMUSCULAR; INTRAVENOUS at 23:53

## 2024-03-01 VITALS
OXYGEN SATURATION: 98 % | RESPIRATION RATE: 18 BRPM | DIASTOLIC BLOOD PRESSURE: 73 MMHG | HEART RATE: 72 BPM | SYSTOLIC BLOOD PRESSURE: 169 MMHG | TEMPERATURE: 98.3 F

## 2024-03-01 LAB
2HR DELTA HS TROPONIN: 0 NG/L
CARDIAC TROPONIN I PNL SERPL HS: 6 NG/L

## 2024-03-01 PROCEDURE — 96361 HYDRATE IV INFUSION ADD-ON: CPT

## 2024-03-01 RX ORDER — ONDANSETRON 4 MG/1
4 TABLET, ORALLY DISINTEGRATING ORAL EVERY 8 HOURS PRN
Qty: 20 TABLET | Refills: 0 | Status: SHIPPED | OUTPATIENT
Start: 2024-03-01

## 2024-03-01 NOTE — ED PROVIDER NOTES
History  Chief Complaint   Patient presents with    Shortness of Breath     Pt c/o sob and cp that started this afternoon, pt has been having generalized weakness and poor appetite for past 3 days. Pt ate once today and immediately vomited. Pt has hx multiple MIs      74 year old male pt comes into the ED with cc of nausea and vomiting.  HE states he has not been able to eat for the last three days but vomited up a lot today, multiple times.  He has not had this before.  He has some abdominal distention but no abdominal tenderness.  Will CT abdomen.        History provided by:  Patient   used: No    Shortness of Breath  Severity:  Mild  Onset quality:  Gradual  Duration:  3 days  Timing:  Constant  Relieved by:  Nothing  Worsened by:  Nothing  Ineffective treatments:  None tried  Associated symptoms: vomiting    Associated symptoms: no abdominal pain        Prior to Admission Medications   Prescriptions Last Dose Informant Patient Reported? Taking?   Blood Gluc Meter Disp-Strips (BLOOD GLUCOSE METER DISPOSABLE) LGORIA   No No   Sig: by Does not apply route 3 (three) times a day before meals   Lancets (freestyle) lancets   No No   Sig: Use as instructed   aspirin 81 mg chewable tablet   Yes No   Sig: Chew 81 mg daily   atorvastatin (LIPITOR) 20 mg tablet   Yes No   Sig: Take 20 mg by mouth daily   cyanocobalamin (VITAMIN B-12) 1,000 mcg tablet   Yes No   Sig: Take by mouth daily   enalapril (VASOTEC) 10 mg tablet   Yes No   Sig: Take 10 mg by mouth daily   famotidine (PEPCID) 20 mg tablet   Yes No   Sig: Take 20 mg by mouth daily   folic acid (FOLVITE) 1 mg tablet   Yes No   Sig: Take 1 mg by mouth daily    furosemide (LASIX) 20 mg tablet   Yes No   Sig: Take 10 mg by mouth daily   gabapentin (NEURONTIN) 300 mg capsule   Yes No   Sig: Take 100 mg by mouth 2 (two) times a day     insulin glargine (LANTUS) 100 units/mL subcutaneous injection   No No   Sig: Inject 15 Units under the skin daily    isosorbide dinitrate (ISORDIL) 30 mg tablet   Yes No   Sig: Take 30 mg by mouth daily   metFORMIN (GLUCOPHAGE) 1000 MG tablet   Yes No   Sig: Take 1,000 mg by mouth 2 (two) times a day with meals   metoprolol succinate (TOPROL-XL) 25 mg 24 hr tablet   Yes No   Sig: Take 25 mg by mouth daily    prasugrel (EFFIENT) tablet   Yes No   Sig: Take 10 mg by mouth daily    sitaGLIPtin (JANUVIA) 100 mg tablet   Yes No   Sig: Take 100 mg by mouth daily      Facility-Administered Medications: None       Past Medical History:   Diagnosis Date    Coronary artery disease     Diabetes mellitus (HCC)     Hyperlipidemia     Hypertension        Past Surgical History:   Procedure Laterality Date    CORONARY ARTERY BYPASS GRAFT         Family History   Family history unknown: Yes     I have reviewed and agree with the history as documented.    E-Cigarette/Vaping    E-Cigarette Use Never User      E-Cigarette/Vaping Substances    Nicotine No     THC No     CBD No     Flavoring No     Other No     Unknown No      Social History     Tobacco Use    Smoking status: Every Day     Current packs/day: 0.25     Types: Cigarettes    Smokeless tobacco: Never    Tobacco comments:     States quit 1 month ago   Vaping Use    Vaping status: Never Used   Substance Use Topics    Alcohol use: Never    Drug use: Never       Review of Systems   Respiratory:  Positive for shortness of breath.    Gastrointestinal:  Positive for vomiting. Negative for abdominal pain.   All other systems reviewed and are negative.      Physical Exam  Physical Exam  Vitals and nursing note reviewed.   Constitutional:       General: He is not in acute distress.     Appearance: He is well-developed. He is not diaphoretic.   HENT:      Head: Normocephalic and atraumatic.      Right Ear: External ear normal.      Left Ear: External ear normal.   Eyes:      General: No scleral icterus.        Right eye: No discharge.         Left eye: No discharge.      Conjunctiva/sclera:  Conjunctivae normal.   Neck:      Thyroid: No thyromegaly.      Vascular: No JVD.      Trachea: No tracheal deviation.   Cardiovascular:      Rate and Rhythm: Normal rate and regular rhythm.   Pulmonary:      Effort: Pulmonary effort is normal. No respiratory distress.      Breath sounds: Normal breath sounds. No stridor. No wheezing or rales.   Abdominal:      General: Bowel sounds are normal. There is no distension.      Palpations: Abdomen is soft.      Tenderness: There is no abdominal tenderness.   Musculoskeletal:         General: No tenderness or deformity. Normal range of motion.      Cervical back: Normal range of motion and neck supple.   Skin:     General: Skin is warm and dry.   Neurological:      Mental Status: He is alert and oriented to person, place, and time.      Cranial Nerves: No cranial nerve deficit.      Coordination: Coordination normal.   Psychiatric:         Behavior: Behavior normal.         Vital Signs  ED Triage Vitals [02/29/24 2021]   Temperature Pulse Respirations Blood Pressure SpO2   98.3 °F (36.8 °C) 75 18 137/62 100 %      Temp Source Heart Rate Source Patient Position - Orthostatic VS BP Location FiO2 (%)   Oral Monitor Sitting Left arm --      Pain Score       --           Vitals:    02/29/24 2021   BP: 137/62   Pulse: 75   Patient Position - Orthostatic VS: Sitting         Visual Acuity      ED Medications  Medications - No data to display    Diagnostic Studies  Results Reviewed       Procedure Component Value Units Date/Time    Comprehensive metabolic panel [[id number]] Collected: 02/29/24 2138    Lab Status: No result Specimen: Blood from Arm, Left     HS Troponin 0hr (reflex protocol) [[id number]] Collected: 02/29/24 2138    Lab Status: No result Specimen: Blood from Arm, Left     FLU/RSV/COVID - if FLU/RSV clinically relevant [[id number]] Collected: 02/29/24 2138    Lab Status: No result Specimen: Nares from Nose     CBC and differential [[id number]]  (Abnormal) Collected:  02/29/24 2051    Lab Status: Final result Specimen: Blood from Arm, Right Updated: 02/29/24 2058     WBC 9.74 Thousand/uL      RBC 4.15 Million/uL      Hemoglobin 10.4 g/dL      Hematocrit 33.6 %      MCV 81 fL      MCH 25.1 pg      MCHC 31.0 g/dL      RDW 14.5 %      MPV 11.1 fL      Platelets 181 Thousands/uL      nRBC 0 /100 WBCs      Neutrophils Relative 79 %      Immat GRANS % 1 %      Lymphocytes Relative 12 %      Monocytes Relative 7 %      Eosinophils Relative 1 %      Basophils Relative 0 %      Neutrophils Absolute 7.85 Thousands/µL      Immature Grans Absolute 0.06 Thousand/uL      Lymphocytes Absolute 1.12 Thousands/µL      Monocytes Absolute 0.65 Thousand/µL      Eosinophils Absolute 0.05 Thousand/µL      Basophils Absolute 0.01 Thousands/µL                    XR chest pa & lateral    (Results Pending)   CT abdomen pelvis with contrast    (Results Pending)              Procedures  Procedures         ED Course                               SBIRT 20yo+      Flowsheet Row Most Recent Value   Initial Alcohol Screen: US AUDIT-C     1. How often do you have a drink containing alcohol? 0 Filed at: 02/29/2024 2023   2. How many drinks containing alcohol do you have on a typical day you are drinking?  0 Filed at: 02/29/2024 2023   3a. Male UNDER 65: How often do you have five or more drinks on one occasion? 0 Filed at: 02/29/2024 2023   3b. FEMALE Any Age, or MALE 65+: How often do you have 4 or more drinks on one occassion? 0 Filed at: 02/29/2024 2023   Audit-C Score 0 Filed at: 02/29/2024 2023                      Medical Decision Making  Amount and/or Complexity of Data Reviewed  Labs: ordered.  Radiology: ordered.             Disposition  Final diagnoses:   None     ED Disposition       None          Follow-up Information    None         Patient's Medications   Discharge Prescriptions    No medications on file       No discharge procedures on file.    PDMP Review       None            ED  Provider  Electronically Signed by

## 2024-03-04 LAB
ATRIAL RATE: 74 BPM
ATRIAL RATE: 75 BPM
P AXIS: 47 DEGREES
P AXIS: 48 DEGREES
PR INTERVAL: 142 MS
PR INTERVAL: 152 MS
QRS AXIS: 74 DEGREES
QRS AXIS: 75 DEGREES
QRSD INTERVAL: 90 MS
QRSD INTERVAL: 94 MS
QT INTERVAL: 384 MS
QT INTERVAL: 392 MS
QTC INTERVAL: 426 MS
QTC INTERVAL: 437 MS
T WAVE AXIS: 66 DEGREES
T WAVE AXIS: 68 DEGREES
VENTRICULAR RATE: 74 BPM
VENTRICULAR RATE: 75 BPM

## 2024-03-04 PROCEDURE — 93010 ELECTROCARDIOGRAM REPORT: CPT | Performed by: INTERNAL MEDICINE

## 2024-03-30 ENCOUNTER — HOSPITAL ENCOUNTER (EMERGENCY)
Facility: HOSPITAL | Age: 74
Discharge: HOME | End: 2024-03-30
Attending: STUDENT IN AN ORGANIZED HEALTH CARE EDUCATION/TRAINING PROGRAM
Payer: COMMERCIAL

## 2024-03-30 ENCOUNTER — APPOINTMENT (EMERGENCY)
Dept: ULTRASOUND IMAGING | Facility: HOSPITAL | Age: 74
End: 2024-03-30
Payer: COMMERCIAL

## 2024-03-30 ENCOUNTER — APPOINTMENT (EMERGENCY)
Dept: RADIOLOGY | Facility: HOSPITAL | Age: 74
End: 2024-03-30
Payer: COMMERCIAL

## 2024-03-30 VITALS
DIASTOLIC BLOOD PRESSURE: 90 MMHG | OXYGEN SATURATION: 98 % | SYSTOLIC BLOOD PRESSURE: 143 MMHG | HEART RATE: 56 BPM | HEIGHT: 70 IN | WEIGHT: 156 LBS | TEMPERATURE: 97.6 F | RESPIRATION RATE: 20 BRPM | BODY MASS INDEX: 22.33 KG/M2

## 2024-03-30 DIAGNOSIS — N45.3 EPIDIDYMOORCHITIS: Primary | ICD-10-CM

## 2024-03-30 LAB
2HR DELTA HS TROPONIN: -2 NG/L
ALBUMIN SERPL BCP-MCNC: 3.4 G/DL (ref 3.5–5)
ALP SERPL-CCNC: 75 U/L (ref 34–104)
ALT SERPL W P-5'-P-CCNC: 6 U/L (ref 7–52)
ANION GAP SERPL CALCULATED.3IONS-SCNC: 7 MMOL/L (ref 4–13)
AST SERPL W P-5'-P-CCNC: 8 U/L (ref 13–39)
BACTERIA UR QL AUTO: ABNORMAL /HPF
BASOPHILS # BLD AUTO: 0.01 THOUSANDS/ÂΜL (ref 0–0.1)
BASOPHILS NFR BLD AUTO: 0 % (ref 0–1)
BILIRUB SERPL-MCNC: 0.54 MG/DL (ref 0.2–1)
BILIRUB UR QL STRIP: NEGATIVE
BUN SERPL-MCNC: 20 MG/DL (ref 5–25)
CALCIUM ALBUM COR SERPL-MCNC: 9.6 MG/DL (ref 8.3–10.1)
CALCIUM SERPL-MCNC: 9.1 MG/DL (ref 8.4–10.2)
CARDIAC TROPONIN I PNL SERPL HS: 11 NG/L
CARDIAC TROPONIN I PNL SERPL HS: 9 NG/L
CHLORIDE SERPL-SCNC: 100 MMOL/L (ref 96–108)
CLARITY UR: ABNORMAL
CO2 SERPL-SCNC: 26 MMOL/L (ref 21–32)
COLOR UR: YELLOW
CREAT SERPL-MCNC: 1.13 MG/DL (ref 0.6–1.3)
EOSINOPHIL # BLD AUTO: 0.01 THOUSAND/ÂΜL (ref 0–0.61)
EOSINOPHIL NFR BLD AUTO: 0 % (ref 0–6)
ERYTHROCYTE [DISTWIDTH] IN BLOOD BY AUTOMATED COUNT: 15.1 % (ref 11.6–15.1)
GFR SERPL CREATININE-BSD FRML MDRD: 63 ML/MIN/1.73SQ M
GLUCOSE SERPL-MCNC: 240 MG/DL (ref 65–140)
GLUCOSE UR STRIP-MCNC: ABNORMAL MG/DL
HCT VFR BLD AUTO: 30.2 % (ref 36.5–49.3)
HGB BLD-MCNC: 9.3 G/DL (ref 12–17)
HGB UR QL STRIP.AUTO: ABNORMAL
IMM GRANULOCYTES # BLD AUTO: 0.06 THOUSAND/UL (ref 0–0.2)
IMM GRANULOCYTES NFR BLD AUTO: 1 % (ref 0–2)
KETONES UR STRIP-MCNC: NEGATIVE MG/DL
LEUKOCYTE ESTERASE UR QL STRIP: ABNORMAL
LYMPHOCYTES # BLD AUTO: 1.85 THOUSANDS/ÂΜL (ref 0.6–4.47)
LYMPHOCYTES NFR BLD AUTO: 18 % (ref 14–44)
MCH RBC QN AUTO: 25 PG (ref 26.8–34.3)
MCHC RBC AUTO-ENTMCNC: 30.8 G/DL (ref 31.4–37.4)
MCV RBC AUTO: 81 FL (ref 82–98)
MONOCYTES # BLD AUTO: 0.92 THOUSAND/ÂΜL (ref 0.17–1.22)
MONOCYTES NFR BLD AUTO: 9 % (ref 4–12)
MUCOUS THREADS UR QL AUTO: ABNORMAL
NEUTROPHILS # BLD AUTO: 7.24 THOUSANDS/ÂΜL (ref 1.85–7.62)
NEUTS SEG NFR BLD AUTO: 72 % (ref 43–75)
NITRITE UR QL STRIP: NEGATIVE
NON-SQ EPI CELLS URNS QL MICRO: ABNORMAL /HPF
NRBC BLD AUTO-RTO: 0 /100 WBCS
PH UR STRIP.AUTO: 5.5 [PH]
PLATELET # BLD AUTO: 182 THOUSANDS/UL (ref 149–390)
PMV BLD AUTO: 10.6 FL (ref 8.9–12.7)
POTASSIUM SERPL-SCNC: 4 MMOL/L (ref 3.5–5.3)
PROT SERPL-MCNC: 6.9 G/DL (ref 6.4–8.4)
PROT UR STRIP-MCNC: ABNORMAL MG/DL
RBC # BLD AUTO: 3.72 MILLION/UL (ref 3.88–5.62)
RBC #/AREA URNS AUTO: ABNORMAL /HPF
SODIUM SERPL-SCNC: 133 MMOL/L (ref 135–147)
SP GR UR STRIP.AUTO: 1.02 (ref 1–1.03)
UROBILINOGEN UR STRIP-ACNC: 3 MG/DL
WBC # BLD AUTO: 10.09 THOUSAND/UL (ref 4.31–10.16)
WBC #/AREA URNS AUTO: ABNORMAL /HPF

## 2024-03-30 PROCEDURE — 87186 SC STD MICRODIL/AGAR DIL: CPT | Performed by: STUDENT IN AN ORGANIZED HEALTH CARE EDUCATION/TRAINING PROGRAM

## 2024-03-30 PROCEDURE — 84484 ASSAY OF TROPONIN QUANT: CPT | Performed by: STUDENT IN AN ORGANIZED HEALTH CARE EDUCATION/TRAINING PROGRAM

## 2024-03-30 PROCEDURE — 96365 THER/PROPH/DIAG IV INF INIT: CPT

## 2024-03-30 PROCEDURE — 85025 COMPLETE CBC W/AUTO DIFF WBC: CPT | Performed by: STUDENT IN AN ORGANIZED HEALTH CARE EDUCATION/TRAINING PROGRAM

## 2024-03-30 PROCEDURE — 81001 URINALYSIS AUTO W/SCOPE: CPT | Performed by: STUDENT IN AN ORGANIZED HEALTH CARE EDUCATION/TRAINING PROGRAM

## 2024-03-30 PROCEDURE — 99285 EMERGENCY DEPT VISIT HI MDM: CPT

## 2024-03-30 PROCEDURE — 99285 EMERGENCY DEPT VISIT HI MDM: CPT | Performed by: STUDENT IN AN ORGANIZED HEALTH CARE EDUCATION/TRAINING PROGRAM

## 2024-03-30 PROCEDURE — 76870 US EXAM SCROTUM: CPT

## 2024-03-30 PROCEDURE — 93005 ELECTROCARDIOGRAM TRACING: CPT

## 2024-03-30 PROCEDURE — 96361 HYDRATE IV INFUSION ADD-ON: CPT

## 2024-03-30 PROCEDURE — 80053 COMPREHEN METABOLIC PANEL: CPT | Performed by: STUDENT IN AN ORGANIZED HEALTH CARE EDUCATION/TRAINING PROGRAM

## 2024-03-30 PROCEDURE — 71046 X-RAY EXAM CHEST 2 VIEWS: CPT

## 2024-03-30 PROCEDURE — 87086 URINE CULTURE/COLONY COUNT: CPT | Performed by: STUDENT IN AN ORGANIZED HEALTH CARE EDUCATION/TRAINING PROGRAM

## 2024-03-30 PROCEDURE — 36415 COLL VENOUS BLD VENIPUNCTURE: CPT | Performed by: STUDENT IN AN ORGANIZED HEALTH CARE EDUCATION/TRAINING PROGRAM

## 2024-03-30 PROCEDURE — 87077 CULTURE AEROBIC IDENTIFY: CPT | Performed by: STUDENT IN AN ORGANIZED HEALTH CARE EDUCATION/TRAINING PROGRAM

## 2024-03-30 RX ORDER — LEVOFLOXACIN 750 MG/1
750 TABLET, FILM COATED ORAL EVERY 24 HOURS
Qty: 10 TABLET | Refills: 0 | Status: SHIPPED | OUTPATIENT
Start: 2024-03-30 | End: 2024-04-09

## 2024-03-30 RX ORDER — ONDANSETRON 4 MG/1
4 TABLET, ORALLY DISINTEGRATING ORAL ONCE
Status: COMPLETED | OUTPATIENT
Start: 2024-03-30 | End: 2024-03-30

## 2024-03-30 RX ORDER — ONDANSETRON 4 MG/1
4 TABLET, ORALLY DISINTEGRATING ORAL EVERY 6 HOURS PRN
Qty: 28 TABLET | Refills: 0 | Status: SHIPPED | OUTPATIENT
Start: 2024-03-30 | End: 2024-04-06

## 2024-03-30 RX ORDER — ACETAMINOPHEN 10 MG/ML
1000 INJECTION, SOLUTION INTRAVENOUS ONCE
Status: COMPLETED | OUTPATIENT
Start: 2024-03-30 | End: 2024-03-30

## 2024-03-30 RX ADMIN — LEVOFLOXACIN 750 MG: 500 TABLET, FILM COATED ORAL at 21:50

## 2024-03-30 RX ADMIN — SODIUM CHLORIDE 1000 ML: 0.9 INJECTION, SOLUTION INTRAVENOUS at 18:19

## 2024-03-30 RX ADMIN — ONDANSETRON 4 MG: 4 TABLET, ORALLY DISINTEGRATING ORAL at 21:50

## 2024-03-30 RX ADMIN — ACETAMINOPHEN 1000 MG: 10 INJECTION INTRAVENOUS at 18:25

## 2024-03-30 NOTE — ED PROVIDER NOTES
History  Chief Complaint   Patient presents with    Weakness - Generalized     Patient reports weakness, fatigue, loss of appetite, shortness of breath on exertion, and testicle swelling for the last several days.      HPI    Patient is a 74-year-old male present emerged department with multiple concerns.  Patient has been having generalized malaise for the last few days.  Patient has had decreased p.o. intake.  Denies any fevers or chills.  Reports testicular pain and redness.  Patient also reports discomfort with urination.  Denies any abdominal discomfort or change in bowel habits.  Denies any nausea or vomiting.  Patient also reports shortness of breath with exertion.  Denies any difficulty breathing or chest pain.  History includes hypertension, CAD, diabetes.  Everyday smoker.    Prior to Admission Medications   Prescriptions Last Dose Informant Patient Reported? Taking?   Blood Gluc Meter Disp-Strips (BLOOD GLUCOSE METER DISPOSABLE) GLORIA   No No   Sig: by Does not apply route 3 (three) times a day before meals   Lancets (freestyle) lancets   No No   Sig: Use as instructed   aspirin 81 mg chewable tablet   Yes No   Sig: Chew 81 mg daily   atorvastatin (LIPITOR) 20 mg tablet   Yes No   Sig: Take 20 mg by mouth daily   cyanocobalamin (VITAMIN B-12) 1,000 mcg tablet   Yes No   Sig: Take by mouth daily   enalapril (VASOTEC) 10 mg tablet   Yes No   Sig: Take 10 mg by mouth daily   famotidine (PEPCID) 20 mg tablet   Yes No   Sig: Take 20 mg by mouth daily   folic acid (FOLVITE) 1 mg tablet   Yes No   Sig: Take 1 mg by mouth daily    furosemide (LASIX) 20 mg tablet   Yes No   Sig: Take 10 mg by mouth daily   gabapentin (NEURONTIN) 300 mg capsule   Yes No   Sig: Take 100 mg by mouth 2 (two) times a day     insulin glargine (LANTUS) 100 units/mL subcutaneous injection   No No   Sig: Inject 15 Units under the skin daily   isosorbide dinitrate (ISORDIL) 30 mg tablet   Yes No   Sig: Take 30 mg by mouth daily   metFORMIN  (GLUCOPHAGE) 1000 MG tablet   Yes No   Sig: Take 1,000 mg by mouth 2 (two) times a day with meals   metoprolol succinate (TOPROL-XL) 25 mg 24 hr tablet   Yes No   Sig: Take 25 mg by mouth daily    ondansetron (ZOFRAN-ODT) 4 mg disintegrating tablet   No No   Sig: Take 1 tablet (4 mg total) by mouth every 8 (eight) hours as needed for nausea or vomiting   prasugrel (EFFIENT) tablet   Yes No   Sig: Take 10 mg by mouth daily    sitaGLIPtin (JANUVIA) 100 mg tablet   Yes No   Sig: Take 100 mg by mouth daily      Facility-Administered Medications: None       Past Medical History:   Diagnosis Date    Coronary artery disease     Diabetes mellitus (HCC)     Hyperlipidemia     Hypertension        Past Surgical History:   Procedure Laterality Date    CORONARY ARTERY BYPASS GRAFT         Family History   Family history unknown: Yes     I have reviewed and agree with the history as documented.    E-Cigarette/Vaping    E-Cigarette Use Never User      E-Cigarette/Vaping Substances    Nicotine No     THC No     CBD No     Flavoring No     Other No     Unknown No      Social History     Tobacco Use    Smoking status: Every Day     Current packs/day: 0.25     Types: Cigarettes    Smokeless tobacco: Never    Tobacco comments:     States quit 1 month ago   Vaping Use    Vaping status: Never Used   Substance Use Topics    Alcohol use: Never    Drug use: Never       Review of Systems   Constitutional:  Negative for chills and fever.   HENT:  Negative for ear pain and sore throat.    Eyes:  Negative for pain and visual disturbance.   Respiratory:  Positive for shortness of breath. Negative for cough.    Cardiovascular:  Negative for chest pain and palpitations.   Gastrointestinal:  Negative for abdominal pain and vomiting.   Genitourinary:  Positive for scrotal swelling and testicular pain. Negative for dysuria and hematuria.   Musculoskeletal:  Negative for arthralgias and back pain.   Skin:  Negative for color change and rash.    Neurological:  Positive for weakness. Negative for seizures and syncope.   All other systems reviewed and are negative.      Physical Exam  Physical Exam  Vitals and nursing note reviewed.   Constitutional:       General: He is not in acute distress.     Appearance: He is well-developed.   HENT:      Head: Normocephalic and atraumatic.   Eyes:      Conjunctiva/sclera: Conjunctivae normal.   Cardiovascular:      Rate and Rhythm: Normal rate and regular rhythm.      Heart sounds: No murmur heard.  Pulmonary:      Effort: Pulmonary effort is normal. No respiratory distress.      Breath sounds: Normal breath sounds.   Abdominal:      Palpations: Abdomen is soft.      Tenderness: There is no abdominal tenderness.   Genitourinary:     Penis: Normal.       Comments: Unilateral testicular swelling left greater than right.  Redness and induration on the scrotal sac.Cremaster reflex intact bilaterally  Musculoskeletal:         General: No swelling.      Cervical back: Neck supple.   Skin:     General: Skin is warm and dry.      Capillary Refill: Capillary refill takes less than 2 seconds.   Neurological:      General: No focal deficit present.      Mental Status: He is alert and oriented to person, place, and time.   Psychiatric:         Mood and Affect: Mood normal.         Vital Signs  ED Triage Vitals   Temperature Pulse Respirations Blood Pressure SpO2   03/30/24 1749 03/30/24 1749 03/30/24 1749 03/30/24 1749 03/30/24 1749   97.6 °F (36.4 °C) 64 18 158/68 100 %      Temp Source Heart Rate Source Patient Position - Orthostatic VS BP Location FiO2 (%)   03/30/24 1749 03/30/24 1749 03/30/24 1749 03/30/24 1749 --   Oral Monitor Sitting Left arm       Pain Score       03/30/24 1903       4           Vitals:    03/30/24 1749 03/30/24 1903 03/30/24 1930   BP: 158/68 (!) 175/85 143/90   Pulse: 64 60 56   Patient Position - Orthostatic VS: Sitting           Visual Acuity      ED Medications  Medications   sodium chloride 0.9 %  bolus 1,000 mL (0 mL Intravenous Stopped 3/30/24 2022)   acetaminophen (Ofirmev) injection 1,000 mg (0 mg Intravenous Stopped 3/30/24 1902)   levofloxacin (LEVAQUIN) tablet 750 mg (750 mg Oral Given 3/30/24 2150)   ondansetron (ZOFRAN-ODT) dispersible tablet 4 mg (4 mg Oral Given 3/30/24 2150)       Diagnostic Studies  Results Reviewed       Procedure Component Value Units Date/Time    HS Troponin I 2hr [[id number]]  (Normal) Collected: 03/30/24 2021    Lab Status: Final result Specimen: Blood from Arm, Right Updated: 03/30/24 2054     hs TnI 2hr 9 ng/L      Delta 2hr hsTnI -2 ng/L     Urine Microscopic [[id number]]  (Abnormal) Collected: 03/30/24 1905    Lab Status: Final result Specimen: Urine, Other Updated: 03/30/24 1918     RBC, UA 4-10 /hpf      WBC, UA 30-50 /hpf      Epithelial Cells Occasional /hpf      Bacteria, UA Occasional /hpf      MUCUS THREADS Occasional    Urine culture [[id number]] Collected: 03/30/24 1905    Lab Status: In process Specimen: Urine, Other Updated: 03/30/24 1918    UA w Reflex to Microscopic w Reflex to Culture [[id number]]  (Abnormal) Collected: 03/30/24 1905    Lab Status: Final result Specimen: Urine, Other Updated: 03/30/24 1911     Color, UA Yellow     Clarity, UA Turbid     Specific Gravity, UA 1.024     pH, UA 5.5     Leukocytes, UA Moderate     Nitrite, UA Negative     Protein,  (2+) mg/dl      Glucose,  (3/10%) mg/dl      Ketones, UA Negative mg/dl      Urobilinogen, UA 3.0 mg/dl      Bilirubin, UA Negative     Occult Blood, UA Small    HS Troponin 0hr (reflex protocol) [[id number]]  (Normal) Collected: 03/30/24 1818    Lab Status: Final result Specimen: Blood from Arm, Right Updated: 03/30/24 1852     hs TnI 0hr 11 ng/L     Comprehensive metabolic panel [[id number]]  (Abnormal) Collected: 03/30/24 1818    Lab Status: Final result Specimen: Blood from Arm, Right Updated: 03/30/24 1842     Sodium 133 mmol/L      Potassium 4.0 mmol/L      Chloride 100 mmol/L      CO2 26  mmol/L      ANION GAP 7 mmol/L      BUN 20 mg/dL      Creatinine 1.13 mg/dL      Glucose 240 mg/dL      Calcium 9.1 mg/dL      Corrected Calcium 9.6 mg/dL      AST 8 U/L      ALT 6 U/L      Alkaline Phosphatase 75 U/L      Total Protein 6.9 g/dL      Albumin 3.4 g/dL      Total Bilirubin 0.54 mg/dL      eGFR 63 ml/min/1.73sq m     Narrative:      [employer] guidelines for Chronic Kidney Disease (CKD):     Stage 1 with normal or high GFR (GFR > 90 mL/min/1.73 square meters)    Stage 2 Mild CKD (GFR = 60-89 mL/min/1.73 square meters)    Stage 3A Moderate CKD (GFR = 45-59 mL/min/1.73 square meters)    Stage 3B Moderate CKD (GFR = 30-44 mL/min/1.73 square meters)    Stage 4 Severe CKD (GFR = 15-29 mL/min/1.73 square meters)    Stage 5 End Stage CKD (GFR <15 mL/min/1.73 square meters)  Note: GFR calculation is accurate only with a steady state creatinine    CBC and differential [[id number]]  (Abnormal) Collected: 03/30/24 1818    Lab Status: Final result Specimen: Blood from Arm, Right Updated: 03/30/24 1826     WBC 10.09 Thousand/uL      RBC 3.72 Million/uL      Hemoglobin 9.3 g/dL      Hematocrit 30.2 %      MCV 81 fL      MCH 25.0 pg      MCHC 30.8 g/dL      RDW 15.1 %      MPV 10.6 fL      Platelets 182 Thousands/uL      nRBC 0 /100 WBCs      Neutrophils Relative 72 %      Immature Grans % 1 %      Lymphocytes Relative 18 %      Monocytes Relative 9 %      Eosinophils Relative 0 %      Basophils Relative 0 %      Neutrophils Absolute 7.24 Thousands/µL      Absolute Immature Grans 0.06 Thousand/uL      Absolute Lymphocytes 1.85 Thousands/µL      Absolute Monocytes 0.92 Thousand/µL      Eosinophils Absolute 0.01 Thousand/µL      Basophils Absolute 0.01 Thousands/µL                    US scrotum and testicles   Final Result by David Street MD (03/30 2003)      No sonographic evidence for testicular mass or torsion. Symmetrically increased Doppler flow of the bilateral testicles and epididymides  which could suggest diffuse epididymoorchitis. Small right epididymal cysts measuring up to 4 mm. Small left    hydrocele is also seen.      Workstation performed: [id number]         XR chest 2 views   Final Result by Tiera Escalera MD (03/31 0637)      No acute cardiopulmonary disease.            Workstation performed: [id number]                    Procedures  Procedures         ED Course                               SBIRT 22yo+      Flowsheet Row Most Recent Value   Initial Alcohol Screen: US AUDIT-C     1. How often do you have a drink containing alcohol? 0 Filed at: 03/30/2024 1755   2. How many drinks containing alcohol do you have on a typical day you are drinking?  0 Filed at: 03/30/2024 1755   3b. FEMALE Any Age, or MALE 65+: How often do you have 4 or more drinks on one occassion? 0 Filed at: 03/30/2024 1755   Audit-C Score 0 Filed at: 03/30/2024 1755   JUAN: How many times in the past year have you...    Used an illegal drug or used a prescription medication for non-medical reasons? Never Filed at: 03/30/2024 1755                      Medical Decision Making  Differential UTI, electrolyte abnormality, dehydration, epididymitis    Patient is a 74-year-old male present emerged permit in no acute respiratory distress and vital signs overall unremarkable.  Patient overall is well-appearing.    Lab work shows a persistent hemoglobin between 9-10.  Discussed these results with patient and family and suggested further evaluation in the outpatient setting.  UA appears positive.  Ultrasound shows concerns for epididymoorchitis.    Chest x-ray shows no acute cardiopulmonary process.    Discussed the test results and findings with patient and family at bedside.  Patient was provided with an antibiotic and medication for nausea.  Discussed symptomatic management as well as return and follow-up precautions.  Verbalized understanding.  Disposition discharge    EKG: rate 63 NSR with signs of acute ischemic  change.    Amount and/or Complexity of Data Reviewed  Labs: ordered.  Radiology: ordered and independent interpretation performed.  ECG/medicine tests: ordered and independent interpretation performed.    Risk  Prescription drug management.             Disposition  Final diagnoses:   Epididymoorchitis     Time reflects when diagnosis was documented in both MDM as applicable and the Disposition within this note       Time User Action Codes Description Comment    3/30/2024  9:30 PM David Guoyssa CHING Add [N45.3] Epididymoorchitis           ED Disposition       ED Disposition   Discharge    Condition   Stable    Date/Time   Sat Mar 30, 2024 2129    Comment   Augusto Edmonds discharge to home/self care.                   Follow-up Information    None         Discharge Medication List as of 3/30/2024  9:33 PM        START taking these medications    Details   levofloxacin (LEVAQUIN) 750 mg tablet Take 1 tablet (750 mg total) by mouth every 24 hours for 10 days, Starting Sat 3/30/2024, Until Tue 4/9/2024, Normal      !! ondansetron (ZOFRAN-ODT) 4 mg disintegrating tablet Take 1 tablet (4 mg total) by mouth every 6 (six) hours as needed for nausea or vomiting for up to 7 days, Starting Sat 3/30/2024, Until Sat 4/6/2024 at 2359, Normal       !! - Potential duplicate medications found. Please discuss with provider.        CONTINUE these medications which have NOT CHANGED    Details   aspirin 81 mg chewable tablet Chew 81 mg daily, Historical Med      atorvastatin (LIPITOR) 20 mg tablet Take 20 mg by mouth daily, Historical Med      Blood Gluc Meter Disp-Strips (BLOOD GLUCOSE METER DISPOSABLE) GLORIA by Does not apply route 3 (three) times a day before meals, Starting Tue 9/1/2020, Normal      cyanocobalamin (VITAMIN B-12) 1,000 mcg tablet Take by mouth daily, Historical Med      enalapril (VASOTEC) 10 mg tablet Take 10 mg by mouth daily, Historical Med      famotidine (PEPCID) 20 mg tablet Take 20 mg by mouth daily, Historical Med       folic acid (FOLVITE) 1 mg tablet Take 1 mg by mouth daily , Historical Med      furosemide (LASIX) 20 mg tablet Take 10 mg by mouth daily, Historical Med      gabapentin (NEURONTIN) 300 mg capsule Take 100 mg by mouth 2 (two) times a day  , Historical Med      insulin glargine (LANTUS) 100 units/mL subcutaneous injection Inject 15 Units under the skin daily, Starting Tue 9/1/2020, No Print      isosorbide dinitrate (ISORDIL) 30 mg tablet Take 30 mg by mouth daily, Historical Med      Lancets (freestyle) lancets Use as instructed, Normal      metFORMIN (GLUCOPHAGE) 1000 MG tablet Take 1,000 mg by mouth 2 (two) times a day with meals, Historical Med      metoprolol succinate (TOPROL-XL) 25 mg 24 hr tablet Take 25 mg by mouth daily , Historical Med      !! ondansetron (ZOFRAN-ODT) 4 mg disintegrating tablet Take 1 tablet (4 mg total) by mouth every 8 (eight) hours as needed for nausea or vomiting, Starting Fri 3/1/2024, Normal      prasugrel (EFFIENT) tablet Take 10 mg by mouth daily , Historical Med      sitaGLIPtin (JANUVIA) 100 mg tablet Take 100 mg by mouth daily, Historical Med       !! - Potential duplicate medications found. Please discuss with provider.          No discharge procedures on file.    PDMP Review       None            ED Provider  Electronically Signed by             Etta Guo DO  03/31/24 [id number]

## 2024-03-31 LAB
ATRIAL RATE: 63 BPM
P AXIS: 57 DEGREES
PR INTERVAL: 156 MS
QRS AXIS: 76 DEGREES
QRSD INTERVAL: 92 MS
QT INTERVAL: 416 MS
QTC INTERVAL: 425 MS
T WAVE AXIS: 35 DEGREES
VENTRICULAR RATE: 63 BPM

## 2024-03-31 PROCEDURE — 93010 ELECTROCARDIOGRAM REPORT: CPT | Performed by: INTERNAL MEDICINE

## 2024-03-31 NOTE — DISCHARGE INSTRUCTIONS
Continue stay well-hydrated.  He can use over-the-counter medication as needed for discomfort.  Wear comfortable pair of briefs with support.  Please take the antibiotic as prescribed.    Follow-up with your primary care physician for reevaluation.    Return if develop any new or worsening symptoms such as change in urethral discharge, increasing discomfort develop any fevers or chills.

## 2024-04-01 LAB — BACTERIA UR CULT: ABNORMAL
